# Patient Record
Sex: FEMALE | Race: WHITE | NOT HISPANIC OR LATINO | Employment: OTHER | URBAN - METROPOLITAN AREA
[De-identification: names, ages, dates, MRNs, and addresses within clinical notes are randomized per-mention and may not be internally consistent; named-entity substitution may affect disease eponyms.]

---

## 2017-12-06 PROBLEM — C79.51 METASTATIC CANCER TO SPINE (HCC): Status: ACTIVE | Noted: 2017-12-06

## 2017-12-06 PROBLEM — C34.92 SMALL CELL CARCINOMA OF LEFT LUNG (HCC): Status: ACTIVE | Noted: 2017-12-06

## 2017-12-07 ENCOUNTER — CONSULT (OUTPATIENT)
Dept: ONCOLOGY | Facility: CLINIC | Age: 77
End: 2017-12-07

## 2017-12-07 VITALS
HEIGHT: 64 IN | BODY MASS INDEX: 18.61 KG/M2 | TEMPERATURE: 98.4 F | WEIGHT: 109 LBS | DIASTOLIC BLOOD PRESSURE: 65 MMHG | RESPIRATION RATE: 14 BRPM | SYSTOLIC BLOOD PRESSURE: 126 MMHG | HEART RATE: 87 BPM

## 2017-12-07 DIAGNOSIS — C34.92 SMALL CELL CARCINOMA OF LEFT LUNG (HCC): Primary | ICD-10-CM

## 2017-12-07 DIAGNOSIS — C79.51 METASTATIC CANCER TO SPINE (HCC): ICD-10-CM

## 2017-12-07 PROCEDURE — 99205 OFFICE O/P NEW HI 60 MIN: CPT | Performed by: INTERNAL MEDICINE

## 2017-12-07 RX ORDER — OXYCODONE HYDROCHLORIDE 30 MG/1
TABLET ORAL
Qty: 125 TABLET | Refills: 0 | Status: SHIPPED | OUTPATIENT
Start: 2017-12-07 | End: 2017-12-22 | Stop reason: HOSPADM

## 2017-12-07 RX ORDER — OXYCODONE AND ACETAMINOPHEN 10; 325 MG/1; MG/1
1 TABLET ORAL EVERY 6 HOURS PRN
COMMUNITY
End: 2017-12-17 | Stop reason: SDUPTHER

## 2017-12-07 RX ORDER — ALPRAZOLAM 0.5 MG/1
0.5 TABLET ORAL 3 TIMES DAILY PRN
COMMUNITY
End: 2017-12-22 | Stop reason: HOSPADM

## 2017-12-07 RX ORDER — GABAPENTIN 600 MG/1
1200 TABLET ORAL 3 TIMES DAILY
COMMUNITY
End: 2017-12-22 | Stop reason: HOSPADM

## 2017-12-07 RX ORDER — PALONOSETRON 0.05 MG/ML
0.25 INJECTION, SOLUTION INTRAVENOUS ONCE
OUTPATIENT
Start: 2017-12-21

## 2017-12-07 RX ORDER — CYCLOBENZAPRINE HCL 10 MG
10 TABLET ORAL AS NEEDED
COMMUNITY
End: 2017-12-17

## 2017-12-07 RX ORDER — IBUPROFEN 800 MG/1
800 TABLET ORAL EVERY 6 HOURS PRN
Refills: 1 | COMMUNITY
Start: 2017-11-28

## 2017-12-07 RX ORDER — RIZATRIPTAN BENZOATE 10 MG/1
10 TABLET ORAL ONCE AS NEEDED
COMMUNITY

## 2017-12-07 RX ORDER — BUTALBITAL, ACETAMINOPHEN AND CAFFEINE 50; 325; 40 MG/1; MG/1; MG/1
1 TABLET ORAL AS NEEDED
COMMUNITY

## 2017-12-07 RX ORDER — OXYCODONE HYDROCHLORIDE 15 MG/1
15 TABLET ORAL EVERY 4 HOURS PRN
COMMUNITY
End: 2017-12-07

## 2017-12-07 NOTE — PROGRESS NOTES
CHIEF COMPLAINT: Small cell lung cancer    REASON FOR REFERRAL: Small cell lung cancer      RECORDS OBTAINED  Records of the patients history including those obtained from  [place] were reviewed and summarized in detail.       Small cell carcinoma of left lung    6/5/2017 Initial Diagnosis     Small cell carcinoma of left lung.  Records from [facility] were reviewed and history obtained thereof, underwent left thoracotomy and left lower wedge resection July 2017 with apparent rapid recurrence.  Was found to have metastatic disease to the spine and underwent kyphoplasty to L3 lesion on 11/1/2017.  Further workup later revealed a complete lesion of L1, no kyphoplasty possible due to posterior disc bulge threatening cord.  Per records received from [facility] she then underwent palliative radiation to T12 through L-2 for pain control.  Treatment was received at [facility], patient being referred to our facility due to moving here to be with family.         7/7/2017 Surgery     Surgery       Procedure:  Wedge resection of left lower lobe 1.4 cm lesion        Underwent left thoracotomy, left lower lobe wedge resection with Dr. Eb Rutledge at [facility].  Operative report findings revealed dense adhesions were found between the lung and the chest wall.               9/25/2017 Imaging     Total body bone scan with abnormal areas of increased activity in the skull, high degree of activity at L3 with recent MRI revealing a fracture in this area, increased activity in the sternum and ribs, indeterminate.         10/6/2017 Imaging     PET/CT skull to mid thigh:  Abnormal hypermetabolic 3.2 cm mass about the inferior left lower lobe maximum SUV 4.5.  Low-grade FDG hypermetabolism along the surgical suture line measuring with SUV value of 2.6.  Multiple ill-defined regions of low-grade FDG hypermetabolism within a few osseous structures, most noticeably the sternum, L1  vertebral body, and pelvis.         11/1/2017 Surgery     Surgery       Procedure:  Fluoroscopic guided L3 kyphoplasty, L3 bone biopsy with Dr. Jose M Dixon, [facility]                HISTORY OF PRESENT ILLNESS:  The patient is a 77 y.o.  female, referred for The patient was diagnosed June 2017 with small cell lung cancer.  She had this resected with left lower wedge resection but with short interval metastasis with compression fracture September 2017.  Kyphoplasty to L3 in October for this compression fracture but continued with pain.  Was treated to 20 gray in 5 fractions to T12 through L-2 for pain control and now comes to [place] to be with her family for further management.  Back pain is her main issue still and takes 15 mg oxycodone 1-2 every 6 hours.  Also takes Xanax 0.5 mg daily at bedtime.  She has a history of emphysema, migraines, pneumonia, and a subdural hematoma due to a fall.    REVIEW OF SYSTEMS:  A 14 point review of systems was performed and is negative except as noted above.    History reviewed. No pertinent past medical history.  Past Surgical History:   Procedure Laterality Date   • BRAIN SURGERY      [place]  Dr. Shelton/Carson  Brain bleed caused from a fall   • COLONOSCOPY     • LUNG BIOPSY Left 06/2017    [place]   • LUNG CANCER SURGERY Left 07/2017    [place]  [facility]       No current outpatient prescriptions on file prior to visit.     No current facility-administered medications on file prior to visit.        No Known Allergies    Social History     Social History   • Marital status:      Spouse name: N/A   • Number of children: N/A   • Years of education: N/A     Social History Main Topics   • Smoking status: Current Every Day Smoker     Packs/day: 2.00     Years: 60.00     Types: Cigarettes   • Smokeless tobacco: Never Used   • Alcohol use None   • Drug use: None   • Sexual activity: Not Asked     Other Topics Concern   • None     Social History Narrative   •  "None       Family History   Problem Relation Age of Onset   • Lung disease Father    • Lymphoma Sister        PHYSICAL EXAM:    /65  Pulse 87  Temp 98.4 °F (36.9 °C)  Resp 14  Ht 162.6 cm (64\")  Wt 49.4 kg (109 lb)  BMI 18.71 kg/m2    ECOG: (0) Fully active, able to carry on all predisease performance without restriction  General: well appearing female in no acute distress  HEENT: sclera anicteric, oropharynx clear  Lymphatics: no cervical, supraclavicular, inguinal, or axillary adenopathy  Cardiovascular: regular rate and rhythm, no murmurs  Neck: Supple; No thyromegaly  Lungs: clear to auscultation bilaterally. No respiratory distress.   Abdomen: soft, nontender, nondistended.  No palpable organomegaly  Extremities: no cyanosis, clubbing, edema, or cords  Skin: no rashes, lesions, bruising, or petechiae  Neuro: Alert and oriented x 4; Moving all extremities.  Psych: No anxiety or depression    No results found for any previous visit.    Assessment/Plan     1. Small cell lung cancer  2. Bone metastasis  3. Severe pain    Discussion:: She is now going to get her care here in [place].  I will get palliative care on board and have increased her oxycodone to 30 mg immediate release one to 2 every 4-6 hours as needed.  We will start her on cisplatin and etoposide given daily for 3 days every 21 days as counts allow along with Xgeva for her bones.  She understands the palliative nature of this and we had her sign informed consent at testing to our intent to control and not cure the cancer.  I need to reestablish her baseline status now but it's been more than a month since her last scan and has had no significant treatment other than radiation in that time and a lot can change and we need accurate assessment at this point.  We'll get her PET scan and MRI of brain here and that can service our future baseline.      Dillan Beltrán MD    12/7/2017  "

## 2017-12-11 ENCOUNTER — OFFICE VISIT (OUTPATIENT)
Dept: CARDIAC SURGERY | Facility: CLINIC | Age: 77
End: 2017-12-11

## 2017-12-11 VITALS
SYSTOLIC BLOOD PRESSURE: 160 MMHG | BODY MASS INDEX: 18.95 KG/M2 | HEIGHT: 64 IN | DIASTOLIC BLOOD PRESSURE: 80 MMHG | HEART RATE: 102 BPM | WEIGHT: 111 LBS | TEMPERATURE: 99.1 F

## 2017-12-11 DIAGNOSIS — C34.92 SMALL CELL CARCINOMA OF LEFT LUNG (HCC): Primary | ICD-10-CM

## 2017-12-11 DIAGNOSIS — C79.51 METASTATIC CANCER TO SPINE (HCC): ICD-10-CM

## 2017-12-11 PROCEDURE — 99204 OFFICE O/P NEW MOD 45 MIN: CPT | Performed by: THORACIC SURGERY (CARDIOTHORACIC VASCULAR SURGERY)

## 2017-12-11 RX ORDER — OXYCODONE AND ACETAMINOPHEN 10; 325 MG/1; MG/1
1 TABLET ORAL EVERY 6 HOURS PRN
COMMUNITY
End: 2017-12-22 | Stop reason: HOSPADM

## 2017-12-11 NOTE — PROGRESS NOTES
12/11/2017  Patient Information  Lorri Bedoya                                                                                          [address]  [place] [address]   1940  'PCP/Referring Physician'  Isidoro Zaidi MD  [phone]  Dillan Beltrán MD  [phone]  Chief Complaint   Patient presents with   • Consult     NP per Dr. Beltrán for Port Placement for Lung Cancer       History of Present Illness:  Patient is referred to me to evaluate for [id number] placement.  She had undergone previously a wedge resection of the left lower lobe lesion at another hospital in July, 2017 and apparently had rapid recurrence and/or metastasis.  She is known to have metastatic disease to the bone also and she has significant complaint from compression fractures of the spine and is referred to see me for evaluation for an [id number] placement for chemotherapy.  At this time she says that her back is in constant pain.      Patient Active Problem List   Diagnosis   • Small cell carcinoma of left lung   • Metastatic cancer to spine     Past Medical History:   Diagnosis Date   • Lung cancer      Past Surgical History:   Procedure Laterality Date   • BRAIN SURGERY      [place] Shelton/Carson  Brain bleed caused from a fall   • COLONOSCOPY     • LUNG BIOPSY Left 06/2017    [place]   • LUNG CANCER SURGERY Left 07/2017    [place]  [facility]       Current Outpatient Prescriptions:   •  ALPRAZolam (XANAX) 0.5 MG tablet, Take 0.5 mg by mouth 3 (Three) Times a Day As Needed for Anxiety., Disp: , Rfl:   •  butalbital-acetaminophen-caffeine (FIORICET, ESGIC) -40 MG per tablet, Take 1 tablet by mouth 2 (Two) Times a Day., Disp: , Rfl:   •  DIPHENHYDRAMINE HCL, SLEEP, PO, Take  by mouth. 50 Mg as needed, Disp: , Rfl:   •  oxyCODONE (ROXICODONE) 30 MG immediate release tablet, Take 1-2 by mouth every 4-6 hours as needed for pain, Disp: 125 tablet, Rfl: 0  •  oxyCODONE-acetaminophen (PERCOCET)  MG per  tablet, Take 1 tablet by mouth Every 6 (Six) Hours As Needed for Moderate Pain ., Disp: , Rfl:   •  rizatriptan (MAXALT) 10 MG tablet, Take 10 mg by mouth 1 (One) Time As Needed for Migraine. May repeat in 2 hours if needed, Disp: , Rfl:   •  cyclobenzaprine (FLEXERIL) 10 MG tablet, Take 10 mg by mouth As Needed for Muscle Spasms., Disp: , Rfl:   •  gabapentin (NEURONTIN) 600 MG tablet, Take 1,200 mg by mouth 3 (Three) Times a Day., Disp: , Rfl:   •  ibuprofen (ADVIL,MOTRIN) 800 MG tablet, , Disp: , Rfl: 1  •  oxyCODONE-acetaminophen (PERCOCET)  MG per tablet, Take 1 tablet by mouth Every 6 (Six) Hours As Needed for Moderate Pain ., Disp: , Rfl:   No Known Allergies  Social History     Social History   • Marital status:      Spouse name: N/A   • Number of children: 2   • Years of education: N/A     Occupational History   • Bookeeper      Retired     Social History Main Topics   • Smoking status: Current Every Day Smoker     Packs/day: 0.25     Years: 60.00     Types: Cigarettes   • Smokeless tobacco: Never Used      Comment: As much as 1 1/2 PPD Prev.    • Alcohol use No   • Drug use: No   • Sexual activity: Not on file     Other Topics Concern   • Not on file     Social History Narrative     Family History   Problem Relation Age of Onset   • Lung disease Father    • Lymphoma Sister      Review of Systems   Constitution: Positive for weight loss (10 lbs this year). Negative for chills, fever, malaise/fatigue and night sweats.   HENT: Negative for hearing loss, odynophagia and sore throat.    Cardiovascular: Positive for dyspnea on exertion and leg swelling. Negative for chest pain, orthopnea and palpitations.   Respiratory: Negative for cough and hemoptysis.    Endocrine: Negative for cold intolerance, heat intolerance, polydipsia, polyphagia and polyuria.   Hematologic/Lymphatic: Bruises/bleeds easily.   Skin: Negative for itching and rash.   Musculoskeletal: Positive for back pain. Negative for joint  "pain, joint swelling and myalgias.   Gastrointestinal: Positive for constipation. Negative for abdominal pain, diarrhea, hematemesis, hematochezia, melena, nausea and vomiting.   Genitourinary: Negative for dysuria, frequency and hematuria.   Neurological: Negative for focal weakness, headaches, numbness and seizures.   Psychiatric/Behavioral: Negative for suicidal ideas.   All other systems reviewed and are negative.    Vitals:    12/11/17 0820   BP: 160/80   BP Location: Left arm   Patient Position: Sitting   Pulse: 102   Temp: 99.1 °F (37.3 °C)   Weight: 50.3 kg (111 lb)   Height: 162.6 cm (64\")      Physical Exam   CONSTITUTIONAL: Alert and conversant, Well dressed, in a wheelchair clearly uncomfortable complaining of back pain  EYES: Sclera clean, Anicteric, Pupils equal  ENT: No nasal deviation, Trachea midline  NECK: No neck masses, Supple  LUNGS: No wheezing, Cough, non-congested  HEART: No rubs, No murmurs  GASTROINTESTINAL: Soft, non-distended, No masses, Non tender  to palpation, normal bowel sounds  NEURO: No motor deficits, No sensory deficits, Cranial Nerves 2 through 12 grossly intact  PSYCHIATRIC: Oriented to person, place and time, No memory deficits, Mood appropriate  VASCULAR: No carotid bruits, Femoral pulses palpable and symmetric  MUSKULOSKELETAL: No extremity trauma or extremity asymmetry    Labs/Imaging:   I reviewed the PET scan report demonstrating the metastatic lesion.    Assessment/Plan:   Metastatic small cell cancer will require chemotherapy and I went over that with the patient and her caregiver today.  She is aware of the planned procedure for a port placement.  She understands that this is a surgical procedure that requires an incision.  The risks of pneumothorax, bleeding and device failure and infection have been explained and she agrees to proceed.       Patient Active Problem List   Diagnosis   • Small cell carcinoma of left lung   • Metastatic cancer to spine     Signed " by: Emir Marcos M.D.    12/11/17    CC:  MD Dillan Díaz MD Debbie Moore, , editing for Emir Marcos M.D.    I, Emir Marcos MD, have read and agree with the editing done by Amanda Jones, .

## 2017-12-13 ENCOUNTER — PREP FOR SURGERY (OUTPATIENT)
Dept: OTHER | Facility: HOSPITAL | Age: 77
End: 2017-12-13

## 2017-12-13 ENCOUNTER — HOSPITAL ENCOUNTER (OUTPATIENT)
Dept: PET IMAGING | Facility: HOSPITAL | Age: 77
Discharge: HOME | End: 2017-12-13
Attending: INTERNAL MEDICINE

## 2017-12-13 ENCOUNTER — HOSPITAL ENCOUNTER (OUTPATIENT)
Dept: MRI IMAGING | Facility: HOSPITAL | Age: 77
Discharge: HOME | End: 2017-12-13
Attending: INTERNAL MEDICINE

## 2017-12-13 ENCOUNTER — HOSPITAL ENCOUNTER (OUTPATIENT)
Dept: PET IMAGING | Facility: HOSPITAL | Age: 77
Discharge: HOME | End: 2017-12-13
Attending: INTERNAL MEDICINE | Admitting: INTERNAL MEDICINE

## 2017-12-13 DIAGNOSIS — C34.92 SMALL CELL CARCINOMA OF LEFT LUNG (HCC): ICD-10-CM

## 2017-12-13 DIAGNOSIS — C34.90 SMALL CELL CARCINOMA OF LUNG, UNSPECIFIED LATERALITY: Primary | ICD-10-CM

## 2017-12-13 DIAGNOSIS — C34.90 MALIGNANT NEOPLASM OF LUNG, UNSPECIFIED LATERALITY, UNSPECIFIED PART OF LUNG (HCC): Primary | ICD-10-CM

## 2017-12-13 LAB — GLUCOSE BLDC GLUCOMTR-MCNC: 111 MG/DL (ref 70–130)

## 2017-12-13 PROCEDURE — 78816 PET IMAGE W/CT FULL BODY: CPT

## 2017-12-13 PROCEDURE — 70553 MRI BRAIN STEM W/O & W/DYE: CPT

## 2017-12-13 PROCEDURE — A9577 INJ MULTIHANCE: HCPCS | Performed by: INTERNAL MEDICINE

## 2017-12-13 PROCEDURE — 0 GADOBENATE DIMEGLUMINE 529 MG/ML SOLUTION: Performed by: INTERNAL MEDICINE

## 2017-12-13 PROCEDURE — 0 FLUDEOXYGLUCOSE F18 SOLUTION: Performed by: INTERNAL MEDICINE

## 2017-12-13 PROCEDURE — A9552 F18 FDG: HCPCS | Performed by: INTERNAL MEDICINE

## 2017-12-13 PROCEDURE — 82565 ASSAY OF CREATININE: CPT

## 2017-12-13 PROCEDURE — 82962 GLUCOSE BLOOD TEST: CPT

## 2017-12-13 RX ADMIN — FLUDEOXYGLUCOSE F18 1 DOSE: 300 INJECTION INTRAVENOUS at 11:55

## 2017-12-13 RX ADMIN — GADOBENATE DIMEGLUMINE 10 ML: 529 INJECTION, SOLUTION INTRAVENOUS at 14:20

## 2017-12-14 ENCOUNTER — TELEPHONE (OUTPATIENT)
Dept: ONCOLOGY | Facility: CLINIC | Age: 77
End: 2017-12-14

## 2017-12-14 DIAGNOSIS — C79.51 METASTATIC CANCER TO SPINE (HCC): ICD-10-CM

## 2017-12-14 DIAGNOSIS — C34.92 SMALL CELL CARCINOMA OF LEFT LUNG (HCC): ICD-10-CM

## 2017-12-14 RX ORDER — CEFAZOLIN SODIUM 2 G/100ML
2 INJECTION, SOLUTION INTRAVENOUS ONCE
Status: CANCELLED | OUTPATIENT
Start: 2017-12-18 | End: 2017-12-18

## 2017-12-14 RX ORDER — LIDOCAINE AND PRILOCAINE 25; 25 MG/G; MG/G
CREAM TOPICAL AS NEEDED
Qty: 30 G | Refills: 3 | Status: SHIPPED | OUTPATIENT
Start: 2017-12-14 | End: 2017-12-17

## 2017-12-14 RX ORDER — CHLORHEXIDINE GLUCONATE 500 MG/1
1 CLOTH TOPICAL EVERY 12 HOURS PRN
Status: CANCELLED | OUTPATIENT
Start: 2017-12-17

## 2017-12-14 NOTE — TELEPHONE ENCOUNTER
----- Message from Cathie Patel MA sent at 12/14/2017 12:04 PM EST -----  Regarding: Leigh Ann- issue w port placement schedule  Contact: [phone]  Pt's daughter Dee states her mother is not due to get her port placed until 8am Monday. Pt is set to start her chemo on Monday. Dee is wondering if her chemo treatments need to be delayed?    Please call to discuss.

## 2017-12-14 NOTE — TELEPHONE ENCOUNTER
Discussed with Dee.  We will not be able to treat her after port placement since she is receiving cisplatin due to time constraints.  We will treat her T-Th for this cycle.  Spoke with Sheba. She will keep current appointments for Tuesday and Wednesday and appointment made for Thursday at 8:00.  Dee aware of new appointment info.

## 2017-12-15 ENCOUNTER — OFFICE VISIT (OUTPATIENT)
Dept: ONCOLOGY | Facility: CLINIC | Age: 77
End: 2017-12-15

## 2017-12-15 ENCOUNTER — LAB (OUTPATIENT)
Dept: LAB | Facility: HOSPITAL | Age: 77
End: 2017-12-15

## 2017-12-15 VITALS
HEIGHT: 64 IN | BODY MASS INDEX: 18.95 KG/M2 | WEIGHT: 111 LBS | SYSTOLIC BLOOD PRESSURE: 126 MMHG | RESPIRATION RATE: 16 BRPM | HEART RATE: 96 BPM | TEMPERATURE: 98 F | DIASTOLIC BLOOD PRESSURE: 56 MMHG

## 2017-12-15 DIAGNOSIS — C79.51 METASTATIC CANCER TO SPINE (HCC): ICD-10-CM

## 2017-12-15 DIAGNOSIS — C34.92 SMALL CELL CARCINOMA OF LEFT LUNG (HCC): Primary | ICD-10-CM

## 2017-12-15 DIAGNOSIS — C34.92 SMALL CELL CARCINOMA OF LEFT LUNG (HCC): ICD-10-CM

## 2017-12-15 LAB
ALBUMIN SERPL-MCNC: 4.1 G/DL (ref 3.2–4.8)
ALBUMIN/GLOB SERPL: 1.4 G/DL (ref 1.5–2.5)
ALP SERPL-CCNC: 174 U/L (ref 25–100)
ALT SERPL W P-5'-P-CCNC: 14 U/L (ref 7–40)
ANION GAP SERPL CALCULATED.3IONS-SCNC: 13 MMOL/L (ref 3–11)
AST SERPL-CCNC: 38 U/L (ref 0–33)
BILIRUB SERPL-MCNC: 0.9 MG/DL (ref 0.3–1.2)
BUN BLD-MCNC: 19 MG/DL (ref 9–23)
BUN/CREAT SERPL: 27.1 (ref 7–25)
CALCIUM SPEC-SCNC: 10.4 MG/DL (ref 8.7–10.4)
CHLORIDE SERPL-SCNC: 97 MMOL/L (ref 99–109)
CO2 SERPL-SCNC: 32 MMOL/L (ref 20–31)
CREAT BLD-MCNC: 0.7 MG/DL (ref 0.6–1.3)
ERYTHROCYTE [DISTWIDTH] IN BLOOD BY AUTOMATED COUNT: 21.1 % (ref 11.3–14.5)
GFR SERPL CREATININE-BSD FRML MDRD: 81 ML/MIN/1.73
GLOBULIN UR ELPH-MCNC: 3 GM/DL
GLUCOSE BLD-MCNC: 125 MG/DL (ref 70–100)
HCT VFR BLD AUTO: 48.6 % (ref 34.5–44)
HGB BLD-MCNC: 15.5 G/DL (ref 11.5–15.5)
LYMPHOCYTES # BLD AUTO: 1 10*3/MM3 (ref 0.6–4.8)
LYMPHOCYTES NFR BLD AUTO: 12.1 % (ref 24–44)
MAGNESIUM SERPL-MCNC: 1.9 MG/DL (ref 1.3–2.7)
MCH RBC QN AUTO: 30.9 PG (ref 27–31)
MCHC RBC AUTO-ENTMCNC: 31.8 G/DL (ref 32–36)
MCV RBC AUTO: 97.1 FL (ref 80–99)
MONOCYTES # BLD AUTO: 0.3 10*3/MM3 (ref 0–1)
MONOCYTES NFR BLD AUTO: 3.1 % (ref 0–12)
NEUTROPHILS # BLD AUTO: 7.2 10*3/MM3 (ref 1.5–8.3)
NEUTROPHILS NFR BLD AUTO: 84.8 % (ref 41–71)
PHOSPHATE SERPL-MCNC: 2.3 MG/DL (ref 2.4–5.1)
PLATELET # BLD AUTO: 100 10*3/MM3 (ref 150–450)
PMV BLD AUTO: 8.3 FL (ref 6–12)
POTASSIUM BLD-SCNC: 2.8 MMOL/L (ref 3.5–5.5)
PROT SERPL-MCNC: 7.1 G/DL (ref 5.7–8.2)
RBC # BLD AUTO: 5 10*6/MM3 (ref 3.89–5.14)
SODIUM BLD-SCNC: 142 MMOL/L (ref 132–146)
WBC NRBC COR # BLD: 8.5 10*3/MM3 (ref 3.5–10.8)

## 2017-12-15 PROCEDURE — 80053 COMPREHEN METABOLIC PANEL: CPT

## 2017-12-15 PROCEDURE — 99214 OFFICE O/P EST MOD 30 MIN: CPT | Performed by: INTERNAL MEDICINE

## 2017-12-15 PROCEDURE — 36415 COLL VENOUS BLD VENIPUNCTURE: CPT

## 2017-12-15 PROCEDURE — 83735 ASSAY OF MAGNESIUM: CPT

## 2017-12-15 PROCEDURE — 85025 COMPLETE CBC W/AUTO DIFF WBC: CPT

## 2017-12-15 PROCEDURE — 84100 ASSAY OF PHOSPHORUS: CPT

## 2017-12-15 RX ORDER — MORPHINE SULFATE 15 MG/1
15 TABLET, FILM COATED, EXTENDED RELEASE ORAL 2 TIMES DAILY
Qty: 60 TABLET | Refills: 0 | Status: SHIPPED | OUTPATIENT
Start: 2017-12-15 | End: 2017-12-22 | Stop reason: HOSPADM

## 2017-12-15 NOTE — PROGRESS NOTES
CHIEF COMPLAINT: Small cell lung cancer management    Problem List:     Small cell carcinoma of left lung    6/5/2017 Initial Diagnosis     Small cell carcinoma of left lung.  Records from [facility] were reviewed and history obtained thereof, underwent left thoracotomy and left lower wedge resection July 2017 with apparent rapid recurrence.  Was found to have metastatic disease to the spine and underwent kyphoplasty to L3 lesion on 11/1/2017.  Further workup later revealed a complete lesion of L1, no kyphoplasty possible due to posterior disc bulge threatening cord.  Per records received from [facility] she then underwent palliative radiation to T12 through L-2 for pain control.  Treatment was received at [facility], patient being referred to our facility due to moving here to be with family.         7/7/2017 Surgery     Surgery       Procedure:  Wedge resection of left lower lobe 1.4 cm lesion        Underwent left thoracotomy, left lower lobe wedge resection with Dr. Eb Rutledge at [facility].  Operative report findings revealed dense adhesions were found between the lung and the chest wall.               9/25/2017 Imaging     Total body bone scan with abnormal areas of increased activity in the skull, high degree of activity at L3 with recent MRI revealing a fracture in this area, increased activity in the sternum and ribs, indeterminate.         10/6/2017 Imaging     PET/CT skull to mid thigh:  Abnormal hypermetabolic 3.2 cm mass about the inferior left lower lobe maximum SUV 4.5.  Low-grade FDG hypermetabolism along the surgical suture line measuring with SUV value of 2.6.  Multiple ill-defined regions of low-grade FDG hypermetabolism within a few osseous structures, most noticeably the sternum, L1 vertebral body, and pelvis.         11/1/2017 Surgery     Surgery       Procedure:  Fluoroscopic guided L3 kyphoplasty, L3 bone biopsy with Dr. Jose M Dixon,  [facility]             12/13/2017 Imaging     PET scan shows too numerous to count bone metastases as well as left lung and pleural abnormality and a liver lesion.  MRI of the brain shows chronic changes.            HISTORY OF PRESENT ILLNESS:  The patient is a 77 y.o. female, here for follow up on management of Small cell lung cancer.  I reviewed her PET and MRI brain images as above and shared them with the patient.  She has too numerous to count bone metastases, left lung, pleural, and probable liver metastases.  No brain metastases.      Past Medical History:   Diagnosis Date   • Lung cancer      Past Surgical History:   Procedure Laterality Date   • BRAIN SURGERY      [place]  Dr. Shelton/Carson  Brain bleed caused from a fall   • COLONOSCOPY     • LUNG BIOPSY Left 06/2017    [place]   • LUNG CANCER SURGERY Left 07/2017    [place]  [facility]       No Known Allergies    Family History and Social History reviewed and changed as necessary      REVIEW OF SYSTEM:   Review of Systems   Constitutional: Negative for appetite change, chills, diaphoresis, fatigue, fever and unexpected weight change.   HENT:   Negative for mouth sores, sore throat and trouble swallowing.    Eyes: Negative for icterus.   Respiratory: Negative for cough, hemoptysis and shortness of breath.    Cardiovascular: Negative for chest pain, leg swelling and palpitations.   Gastrointestinal: Negative for abdominal distention, abdominal pain, blood in stool, constipation, diarrhea, nausea and vomiting.   Endocrine: Negative for hot flashes.   Genitourinary: Negative for bladder incontinence, difficulty urinating, dysuria, frequency and hematuria.    Musculoskeletal: Negative for gait problem, neck pain and neck stiffness.   Skin: Negative for rash.   Neurological: Negative for dizziness, gait problem, headaches, light-headedness and numbness.   Hematological: Negative for adenopathy. Does not bruise/bleed easily.  "  Psychiatric/Behavioral: Negative for depression. The patient is not nervous/anxious.    All other systems reviewed and are negative.       PHYSICAL EXAM    Vitals:    12/15/17 1029   BP: 126/56   Pulse: 96   Resp: 16   Temp: 98 °F (36.7 °C)   Weight: 50.3 kg (111 lb)   Height: 162.6 cm (64\")     Constitutional: Appears well-developed and well-nourished. No distress.   ECOG: (0) Fully active, able to carry on all predisease performance without restriction  HENT:   Head: Normocephalic.   Mouth/Throat: Oropharynx is clear and moist.   Eyes: Conjunctivae are normal. Pupils are equal, round, and reactive to light. No scleral icterus.   Neck: Neck supple. No JVD present. No thyromegaly present.   Cardiovascular: Normal rate, regular rhythm and normal heart sounds.    Pulmonary/Chest: Breath sounds normal. No respiratory distress.   Abdominal: Soft. Exhibits no distension and no mass. There is no hepatosplenomegaly. There is no tenderness. There is no rebound and no guarding.   Musculoskeletal:Exhibits no edema, tenderness or deformity.   Neurological: Alert and oriented to person, place, and time. Exhibits normal muscle tone.   Skin: No ecchymosis, no petechiae and no rash noted. Not diaphoretic. No cyanosis. Nails show no clubbing.   Psychiatric: Normal mood and affect.   Vitals reviewed.      Lab on 12/15/2017   Component Date Value Ref Range Status   • WBC 12/15/2017 8.50  3.50 - 10.80 10*3/mm3 Final   • RBC 12/15/2017 5.00  3.89 - 5.14 10*6/mm3 Final   • Hemoglobin 12/15/2017 15.5  11.5 - 15.5 g/dL Final   • Hematocrit 12/15/2017 48.6* 34.5 - 44.0 % Final   • RDW 12/15/2017 21.1* 11.3 - 14.5 % Final   • MCV 12/15/2017 97.1  80.0 - 99.0 fL Final   • MCH 12/15/2017 30.9  27.0 - 31.0 pg Final   • MCHC 12/15/2017 31.8* 32.0 - 36.0 g/dL Final   • MPV 12/15/2017 8.3  6.0 - 12.0 fL Final   • Platelets 12/15/2017 100* 150 - 450 10*3/mm3 Final    Verified by repeat analysis.    • Neutrophil % 12/15/2017 84.8* 41.0 - 71.0 % " Final   • Lymphocyte % 12/15/2017 12.1* 24.0 - 44.0 % Final   • Monocyte % 12/15/2017 3.1  0.0 - 12.0 % Final   • Neutrophils, Absolute 12/15/2017 7.20  1.50 - 8.30 10*3/mm3 Final   • Lymphocytes, Absolute 12/15/2017 1.00  0.60 - 4.80 10*3/mm3 Final   • Monocytes, Absolute 12/15/2017 0.30  0.00 - 1.00 10*3/mm3 Final   Hospital Outpatient Visit on 12/13/2017   Component Date Value Ref Range Status   • Glucose 12/13/2017 111  70 - 130 mg/dL Final       Assessment/Plan     1.  Small cell lung cancer  2. Bone metastases  3. Lung and pleural involvement  4. Probable liver metastasis  5. Bone pains    Discussion: I want her to follow-up with palliative care for symptom management.  We'll start cisplatin and etoposide course #1 on 12/19/17 along with Xgeva for her bones.  Risks including renal dysfunction and pancytopenia and neuropathy were all discussed.  She has signed consent.  We'll follow her up on January 8 for course #2.  The Xgeva we will give every 6 weeks.  We will try to give the chemotherapy every 3 weeks.  Risks including infection and death were discussed. Add MS Contin 15mg every 12 hours.      Dillan Beltrán MD    12/15/2017

## 2017-12-17 ENCOUNTER — APPOINTMENT (OUTPATIENT)
Dept: PREADMISSION TESTING | Facility: HOSPITAL | Age: 77
End: 2017-12-17

## 2017-12-17 VITALS — BODY MASS INDEX: 18.66 KG/M2 | WEIGHT: 111.99 LBS | HEIGHT: 65 IN

## 2017-12-17 DIAGNOSIS — C34.90 MALIGNANT NEOPLASM OF LUNG, UNSPECIFIED LATERALITY, UNSPECIFIED PART OF LUNG (HCC): ICD-10-CM

## 2017-12-17 LAB
ANION GAP SERPL CALCULATED.3IONS-SCNC: 13 MMOL/L (ref 3–11)
APTT PPP: 25.2 SECONDS (ref 24–31)
BUN BLD-MCNC: 14 MG/DL (ref 9–23)
BUN/CREAT SERPL: 23.3 (ref 7–25)
CALCIUM SPEC-SCNC: 10.4 MG/DL (ref 8.7–10.4)
CHLORIDE SERPL-SCNC: 98 MMOL/L (ref 99–109)
CO2 SERPL-SCNC: 34 MMOL/L (ref 20–31)
CREAT BLD-MCNC: 0.6 MG/DL (ref 0.6–1.3)
DEPRECATED RDW RBC AUTO: 68.9 FL (ref 37–54)
ERYTHROCYTE [DISTWIDTH] IN BLOOD BY AUTOMATED COUNT: 19.6 % (ref 11.3–14.5)
GFR SERPL CREATININE-BSD FRML MDRD: 96 ML/MIN/1.73
GLUCOSE BLD-MCNC: 149 MG/DL (ref 70–100)
HBA1C MFR BLD: 5.7 % (ref 4.8–5.6)
HCT VFR BLD AUTO: 48.8 % (ref 34.5–44)
HGB BLD-MCNC: 15.5 G/DL (ref 11.5–15.5)
INR PPP: 1.1
MCH RBC QN AUTO: 32.2 PG (ref 27–31)
MCHC RBC AUTO-ENTMCNC: 31.8 G/DL (ref 32–36)
MCV RBC AUTO: 101.2 FL (ref 80–99)
PLATELET # BLD AUTO: 67 10*3/MM3 (ref 150–450)
PMV BLD AUTO: 11.3 FL (ref 6–12)
POTASSIUM BLD-SCNC: 2.8 MMOL/L (ref 3.5–5.5)
PROTHROMBIN TIME: 12 SECONDS (ref 9.6–11.5)
RBC # BLD AUTO: 4.82 10*6/MM3 (ref 3.89–5.14)
SODIUM BLD-SCNC: 145 MMOL/L (ref 132–146)
WBC NRBC COR # BLD: 7.8 10*3/MM3 (ref 3.5–10.8)

## 2017-12-17 PROCEDURE — 83036 HEMOGLOBIN GLYCOSYLATED A1C: CPT | Performed by: PHYSICIAN ASSISTANT

## 2017-12-17 PROCEDURE — 85610 PROTHROMBIN TIME: CPT | Performed by: PHYSICIAN ASSISTANT

## 2017-12-17 PROCEDURE — 85730 THROMBOPLASTIN TIME PARTIAL: CPT | Performed by: PHYSICIAN ASSISTANT

## 2017-12-17 PROCEDURE — 80048 BASIC METABOLIC PNL TOTAL CA: CPT | Performed by: PHYSICIAN ASSISTANT

## 2017-12-17 PROCEDURE — 36415 COLL VENOUS BLD VENIPUNCTURE: CPT

## 2017-12-17 PROCEDURE — 93010 ELECTROCARDIOGRAM REPORT: CPT | Performed by: INTERNAL MEDICINE

## 2017-12-17 PROCEDURE — 85027 COMPLETE CBC AUTOMATED: CPT | Performed by: PHYSICIAN ASSISTANT

## 2017-12-17 PROCEDURE — 93005 ELECTROCARDIOGRAM TRACING: CPT

## 2017-12-17 NOTE — PAT
plt count and K+ called to [facility] - have pt come early if can to pre-op and have K+ repeated in pre op per [facility]-   Contacted Pt. Daughter and they will try to come earlier in the a.m. But they are not certain it will be much earlier than previously planned.

## 2017-12-18 ENCOUNTER — HOSPITAL ENCOUNTER (OUTPATIENT)
Facility: HOSPITAL | Age: 77
Setting detail: HOSPITAL OUTPATIENT SURGERY
End: 2017-12-18
Attending: THORACIC SURGERY (CARDIOTHORACIC VASCULAR SURGERY) | Admitting: THORACIC SURGERY (CARDIOTHORACIC VASCULAR SURGERY)

## 2017-12-18 ENCOUNTER — PREP FOR SURGERY (OUTPATIENT)
Dept: OTHER | Facility: HOSPITAL | Age: 77
End: 2017-12-18

## 2017-12-18 ENCOUNTER — HOSPITAL ENCOUNTER (OUTPATIENT)
Facility: HOSPITAL | Age: 77
Setting detail: HOSPITAL OUTPATIENT SURGERY
Discharge: HOME | End: 2017-12-18
Attending: THORACIC SURGERY (CARDIOTHORACIC VASCULAR SURGERY) | Admitting: THORACIC SURGERY (CARDIOTHORACIC VASCULAR SURGERY)

## 2017-12-18 ENCOUNTER — APPOINTMENT (OUTPATIENT)
Dept: ONCOLOGY | Facility: HOSPITAL | Age: 77
End: 2017-12-18

## 2017-12-18 DIAGNOSIS — C34.90 MALIGNANT NEOPLASM OF LUNG, UNSPECIFIED LATERALITY, UNSPECIFIED PART OF LUNG (HCC): ICD-10-CM

## 2017-12-18 DIAGNOSIS — C34.90 LUNG CANCER (HCC): Primary | ICD-10-CM

## 2017-12-18 LAB
CREAT BLDA-MCNC: 0.6 MG/DL (ref 0.6–1.3)
POTASSIUM BLD-SCNC: 2.7 MMOL/L (ref 3.5–5.5)

## 2017-12-18 PROCEDURE — 84132 ASSAY OF SERUM POTASSIUM: CPT | Performed by: PHYSICIAN ASSISTANT

## 2017-12-18 RX ORDER — POTASSIUM CHLORIDE 750 MG/1
40 CAPSULE, EXTENDED RELEASE ORAL ONCE
Status: COMPLETED | OUTPATIENT
Start: 2017-12-18 | End: 2017-12-18

## 2017-12-18 RX ORDER — FAMOTIDINE 10 MG/ML
20 INJECTION, SOLUTION INTRAVENOUS ONCE
Status: COMPLETED | OUTPATIENT
Start: 2017-12-18 | End: 2017-12-18

## 2017-12-18 RX ORDER — SODIUM CHLORIDE, SODIUM LACTATE, POTASSIUM CHLORIDE, CALCIUM CHLORIDE 600; 310; 30; 20 MG/100ML; MG/100ML; MG/100ML; MG/100ML
9 INJECTION, SOLUTION INTRAVENOUS CONTINUOUS PRN
Status: DISCONTINUED | OUTPATIENT
Start: 2017-12-18 | End: 2017-12-18 | Stop reason: HOSPADM

## 2017-12-18 RX ORDER — LIDOCAINE HYDROCHLORIDE 10 MG/ML
0.5 INJECTION, SOLUTION EPIDURAL; INFILTRATION; INTRACAUDAL; PERINEURAL ONCE AS NEEDED
Status: COMPLETED | OUTPATIENT
Start: 2017-12-18 | End: 2017-12-18

## 2017-12-18 RX ORDER — LIDOCAINE AND PRILOCAINE 25; 25 MG/G; MG/G
CREAM TOPICAL AS NEEDED
COMMUNITY

## 2017-12-18 RX ORDER — CHLORHEXIDINE GLUCONATE 500 MG/1
1 CLOTH TOPICAL EVERY 12 HOURS PRN
Status: DISCONTINUED | OUTPATIENT
Start: 2017-12-18 | End: 2017-12-18 | Stop reason: HOSPADM

## 2017-12-18 RX ORDER — CEFAZOLIN SODIUM 2 G/100ML
2 INJECTION, SOLUTION INTRAVENOUS ONCE
Status: DISCONTINUED | OUTPATIENT
Start: 2017-12-18 | End: 2017-12-18 | Stop reason: HOSPADM

## 2017-12-18 RX ORDER — FAMOTIDINE 20 MG/1
20 TABLET, FILM COATED ORAL ONCE
Status: COMPLETED | OUTPATIENT
Start: 2017-12-18 | End: 2017-12-18

## 2017-12-18 RX ADMIN — FAMOTIDINE 20 MG: 20 TABLET ORAL at 08:22

## 2017-12-18 RX ADMIN — POTASSIUM CHLORIDE 40 MEQ: 750 CAPSULE, EXTENDED RELEASE ORAL at 08:48

## 2017-12-18 RX ADMIN — LIDOCAINE HYDROCHLORIDE 0.5 ML: 10 INJECTION, SOLUTION EPIDURAL; INFILTRATION; INTRACAUDAL; PERINEURAL at 08:22

## 2017-12-18 RX ADMIN — SODIUM CHLORIDE, POTASSIUM CHLORIDE, SODIUM LACTATE AND CALCIUM CHLORIDE 9 ML/HR: 600; 310; 30; 20 INJECTION, SOLUTION INTRAVENOUS at 08:22

## 2017-12-18 NOTE — INTERVAL H&P NOTE
Pre-Op H&P (See Recent Office Note Attached for Full H&P)    Review of Systems:  General ROS:  no fever, chills, rashes, No change since last office visit  Cardiovascular ROS: no chest pain or dyspnea on exertion  Respiratory ROS: no cough, shortness of breath, or wheezing    Immunization History:   Influenza:  no  Pneumococcal:  Yes < 5 years  Tetanus:  unknown    Meds:    No current facility-administered medications on file prior to encounter.      Current Outpatient Prescriptions on File Prior to Encounter   Medication Sig Dispense Refill   • ALPRAZolam (XANAX) 0.5 MG tablet Take 0.5 mg by mouth 3 (Three) Times a Day As Needed for Anxiety.     • butalbital-acetaminophen-caffeine (FIORICET, ESGIC) -40 MG per tablet Take 1 tablet by mouth As Needed for Headache or Migraine.     • DIPHENHYDRAMINE HCL, SLEEP, PO Take 50 mg by mouth Every Night. 50 Mg as needed      • gabapentin (NEURONTIN) 600 MG tablet Take 1,200 mg by mouth 3 (Three) Times a Day.     • ibuprofen (ADVIL,MOTRIN) 800 MG tablet Take 800 mg by mouth Every 6 (Six) Hours As Needed for Moderate Pain .  1   • oxyCODONE (ROXICODONE) 30 MG immediate release tablet Take 1-2 by mouth every 4-6 hours as needed for pain (Patient taking differently: Take 30 mg by mouth Every 4 (Four) Hours As Needed for Moderate Pain . Take 1-2 by mouth every 4-6 hours as needed for pain) 125 tablet 0   • oxyCODONE-acetaminophen (PERCOCET)  MG per tablet Take 1 tablet by mouth Every 6 (Six) Hours As Needed for Moderate Pain .     • rizatriptan (MAXALT) 10 MG tablet Take 10 mg by mouth 1 (One) Time As Needed for Migraine. May repeat in 2 hours if needed         Vital Signs:  Afebrile HR 95 O2 99% /69  Physical Exam:    CV:  S1S2 regular rate and rhythm, no murmur               Resp:  Coarse to auscultation, diminished in bases; respirations regular, even and unlabored    Results Review:    I reviewed the patient's new clinical results.    Cancer Staging (if  applicable)  Cancer Patient: _x_ yes __no __unknown; If yes, clinical stage T:__ N:__M:__, stage group or __N/A    Assessment/Plan:    Metastatic small cell lung cancer - Port placement    Radha Horne, APRN  12/18/2017   8:12 AM

## 2017-12-18 NOTE — H&P (VIEW-ONLY)
12/11/2017  Patient Information  Lorri Bedoya                                                                                          [address]  [place] [address]   1940  'PCP/Referring Physician'  Isidoro Zaidi MD  [phone]  Dillan Beltrán MD  [phone]  Chief Complaint   Patient presents with   • Consult     NP per Dr. Beltrán for Port Placement for Lung Cancer       History of Present Illness:  Patient is referred to me to evaluate for Txnnka-[id number]-[id number] placement.  She had undergone previously a wedge resection of the left lower lobe lesion at another hospital in July, 2017 and apparently had rapid recurrence and/or metastasis.  She is known to have metastatic disease to the bone also and she has significant complaint from compression fractures of the spine and is referred to see me for evaluation for an [id number]-g-Htxv placement for chemotherapy.  At this time she says that her back is in constant pain.      Patient Active Problem List   Diagnosis   • Small cell carcinoma of left lung   • Metastatic cancer to spine     Past Medical History:   Diagnosis Date   • Lung cancer      Past Surgical History:   Procedure Laterality Date   • BRAIN SURGERY      [facility]  Brain bleed caused from a fall   • COLONOSCOPY     • LUNG BIOPSY Left 06/2017    [facility]   • LUNG CANCER SURGERY Left 07/2017    [facility]  [facility]       Current Outpatient Prescriptions:   •  ALPRAZolam (XANAX) 0.5 MG tablet, Take 0.5 mg by mouth 3 (Three) Times a Day As Needed for Anxiety., Disp: , Rfl:   •  butalbital-acetaminophen-caffeine (FIORICET, ESGIC) -40 MG per tablet, Take 1 tablet by mouth 2 (Two) Times a Day., Disp: , Rfl:   •  DIPHENHYDRAMINE HCL, SLEEP, PO, Take  by mouth. 50 Mg as needed, Disp: , Rfl:   •  oxyCODONE (ROXICODONE) 30 MG immediate release tablet, Take 1-2 by mouth every 4-6 hours as needed for pain, Disp: 125 tablet, Rfl: 0  •  oxyCODONE-acetaminophen (PERCOCET)  MG per  tablet, Take 1 tablet by mouth Every 6 (Six) Hours As Needed for Moderate Pain ., Disp: , Rfl:   •  rizatriptan (MAXALT) 10 MG tablet, Take 10 mg by mouth 1 (One) Time As Needed for Migraine. May repeat in 2 hours if needed, Disp: , Rfl:   •  cyclobenzaprine (FLEXERIL) 10 MG tablet, Take 10 mg by mouth As Needed for Muscle Spasms., Disp: , Rfl:   •  gabapentin (NEURONTIN) 600 MG tablet, Take 1,200 mg by mouth 3 (Three) Times a Day., Disp: , Rfl:   •  ibuprofen (ADVIL,MOTRIN) 800 MG tablet, , Disp: , Rfl: 1  •  oxyCODONE-acetaminophen (PERCOCET)  MG per tablet, Take 1 tablet by mouth Every 6 (Six) Hours As Needed for Moderate Pain ., Disp: , Rfl:   No Known Allergies  Social History     Social History   • Marital status:      Spouse name: N/A   • Number of children: 2   • Years of education: N/A     Occupational History   • Bookeeper      Retired     Social History Main Topics   • Smoking status: Current Every Day Smoker     Packs/day: 0.25     Years: 60.00     Types: Cigarettes   • Smokeless tobacco: Never Used      Comment: As much as 1 1/2 PPD Prev.    • Alcohol use No   • Drug use: No   • Sexual activity: Not on file     Other Topics Concern   • Not on file     Social History Narrative     Family History   Problem Relation Age of Onset   • Lung disease Father    • Lymphoma Sister      Review of Systems   Constitution: Positive for weight loss (10 lbs this year). Negative for chills, fever, malaise/fatigue and night sweats.   HENT: Negative for hearing loss, odynophagia and sore throat.    Cardiovascular: Positive for dyspnea on exertion and leg swelling. Negative for chest pain, orthopnea and palpitations.   Respiratory: Negative for cough and hemoptysis.    Endocrine: Negative for cold intolerance, heat intolerance, polydipsia, polyphagia and polyuria.   Hematologic/Lymphatic: Bruises/bleeds easily.   Skin: Negative for itching and rash.   Musculoskeletal: Positive for back pain. Negative for joint  "pain, joint swelling and myalgias.   Gastrointestinal: Positive for constipation. Negative for abdominal pain, diarrhea, hematemesis, hematochezia, melena, nausea and vomiting.   Genitourinary: Negative for dysuria, frequency and hematuria.   Neurological: Negative for focal weakness, headaches, numbness and seizures.   Psychiatric/Behavioral: Negative for suicidal ideas.   All other systems reviewed and are negative.    Vitals:    12/11/17 0820   BP: 160/80   BP Location: Left arm   Patient Position: Sitting   Pulse: 102   Temp: 99.1 °F (37.3 °C)   Weight: 50.3 kg (111 lb)   Height: 162.6 cm (64\")      Physical Exam   CONSTITUTIONAL: Alert and conversant, Well dressed, in a wheelchair clearly uncomfortable complaining of back pain  EYES: Sclera clean, Anicteric, Pupils equal  ENT: No nasal deviation, Trachea midline  NECK: No neck masses, Supple  LUNGS: No wheezing, Cough, non-congested  HEART: No rubs, No murmurs  GASTROINTESTINAL: Soft, non-distended, No masses, Non tender  to palpation, normal bowel sounds  NEURO: No motor deficits, No sensory deficits, Cranial Nerves 2 through 12 grossly intact  PSYCHIATRIC: Oriented to person, place and time, No memory deficits, Mood appropriate  VASCULAR: No carotid bruits, Femoral pulses palpable and symmetric  MUSKULOSKELETAL: No extremity trauma or extremity asymmetry    Labs/Imaging:   I reviewed the PET scan report demonstrating the metastatic lesion.    Assessment/Plan:   Metastatic small cell cancer will require chemotherapy and I went over that with the patient and her caregiver today.  She is aware of the planned procedure for a port placement.  She understands that this is a surgical procedure that requires an incision.  The risks of pneumothorax, bleeding and device failure and infection have been explained and she agrees to proceed.       Patient Active Problem List   Diagnosis   • Small cell carcinoma of left lung   • Metastatic cancer to spine     Signed " by: Emir Marcos M.D.    12/11/17    CC:  MD Dillan Díaz MD Debbie Moore, , editing for Emir Marcos M.D.    I, Emir Marcos MD, have read and agree with the editing done by Amanda Jones, .

## 2017-12-19 ENCOUNTER — APPOINTMENT (OUTPATIENT)
Dept: ONCOLOGY | Facility: HOSPITAL | Age: 77
End: 2017-12-19

## 2017-12-19 ENCOUNTER — APPOINTMENT (OUTPATIENT)
Dept: GENERAL RADIOLOGY | Facility: HOSPITAL | Age: 77
End: 2017-12-19

## 2017-12-19 ENCOUNTER — HOSPITAL ENCOUNTER (INPATIENT)
Facility: HOSPITAL | Age: 77
LOS: 2 days | Discharge: HOSPICE | End: 2017-12-22
Attending: HOSPITALIST | Admitting: INTERNAL MEDICINE

## 2017-12-19 ENCOUNTER — TELEPHONE (OUTPATIENT)
Dept: ONCOLOGY | Facility: CLINIC | Age: 77
End: 2017-12-19

## 2017-12-19 PROBLEM — R09.02 HYPOXIA: Status: ACTIVE | Noted: 2017-12-19

## 2017-12-19 PROBLEM — Z72.0 TOBACCO ABUSE: Status: ACTIVE | Noted: 2017-12-19

## 2017-12-19 PROBLEM — E83.39 HYPOPHOSPHATEMIA: Status: ACTIVE | Noted: 2017-12-19

## 2017-12-19 PROBLEM — C34.90 LUNG CANCER (HCC): Status: ACTIVE | Noted: 2017-12-18

## 2017-12-19 PROBLEM — M54.59 INTRACTABLE LOW BACK PAIN: Status: ACTIVE | Noted: 2017-12-19

## 2017-12-19 PROBLEM — E87.6 HYPOKALEMIA: Status: ACTIVE | Noted: 2017-12-19

## 2017-12-19 PROBLEM — R53.1 GENERALIZED WEAKNESS: Status: ACTIVE | Noted: 2017-12-19

## 2017-12-19 PROBLEM — G93.40 ENCEPHALOPATHY: Status: ACTIVE | Noted: 2017-12-19

## 2017-12-19 LAB
ALBUMIN SERPL-MCNC: 3.5 G/DL (ref 3.2–4.8)
ALBUMIN/GLOB SERPL: 1.3 G/DL (ref 1.5–2.5)
ALP SERPL-CCNC: 144 U/L (ref 25–100)
ALT SERPL W P-5'-P-CCNC: 15 U/L (ref 7–40)
ANION GAP SERPL CALCULATED.3IONS-SCNC: 5 MMOL/L (ref 3–11)
AST SERPL-CCNC: 37 U/L (ref 0–33)
BASOPHILS # BLD AUTO: 0.02 10*3/MM3 (ref 0–0.2)
BASOPHILS NFR BLD AUTO: 0.3 % (ref 0–1)
BILIRUB SERPL-MCNC: 0.9 MG/DL (ref 0.3–1.2)
BNP SERPL-MCNC: 779 PG/ML (ref 0–100)
BUN BLD-MCNC: 11 MG/DL (ref 9–23)
BUN/CREAT SERPL: 22 (ref 7–25)
CALCIUM SPEC-SCNC: 9.7 MG/DL (ref 8.7–10.4)
CHLORIDE SERPL-SCNC: 106 MMOL/L (ref 99–109)
CO2 SERPL-SCNC: 34 MMOL/L (ref 20–31)
CREAT BLD-MCNC: 0.5 MG/DL (ref 0.6–1.3)
DEPRECATED RDW RBC AUTO: 74 FL (ref 37–54)
EOSINOPHIL # BLD AUTO: 0.01 10*3/MM3 (ref 0–0.3)
EOSINOPHIL NFR BLD AUTO: 0.2 % (ref 0–3)
ERYTHROCYTE [DISTWIDTH] IN BLOOD BY AUTOMATED COUNT: 20.2 % (ref 11.3–14.5)
GFR SERPL CREATININE-BSD FRML MDRD: 119 ML/MIN/1.73
GLOBULIN UR ELPH-MCNC: 2.7 GM/DL
GLUCOSE BLD-MCNC: 136 MG/DL (ref 70–100)
HCT VFR BLD AUTO: 46 % (ref 34.5–44)
HGB BLD-MCNC: 13.9 G/DL (ref 11.5–15.5)
IMM GRANULOCYTES # BLD: 0.09 10*3/MM3 (ref 0–0.03)
IMM GRANULOCYTES NFR BLD: 1.4 % (ref 0–0.6)
LYMPHOCYTES # BLD AUTO: 0.81 10*3/MM3 (ref 0.6–4.8)
LYMPHOCYTES NFR BLD AUTO: 12.4 % (ref 24–44)
MAGNESIUM SERPL-MCNC: 1.8 MG/DL (ref 1.3–2.7)
MCH RBC QN AUTO: 31.2 PG (ref 27–31)
MCHC RBC AUTO-ENTMCNC: 30.2 G/DL (ref 32–36)
MCV RBC AUTO: 103.4 FL (ref 80–99)
MONOCYTES # BLD AUTO: 0.72 10*3/MM3 (ref 0–1)
MONOCYTES NFR BLD AUTO: 11 % (ref 0–12)
NEUTROPHILS # BLD AUTO: 4.88 10*3/MM3 (ref 1.5–8.3)
NEUTROPHILS NFR BLD AUTO: 74.7 % (ref 41–71)
PHOSPHATE SERPL-MCNC: 2.8 MG/DL (ref 2.4–5.1)
PLATELET # BLD AUTO: 60 10*3/MM3 (ref 150–450)
POTASSIUM BLD-SCNC: 3.4 MMOL/L (ref 3.5–5.5)
PROT SERPL-MCNC: 6.2 G/DL (ref 5.7–8.2)
RBC # BLD AUTO: 4.45 10*6/MM3 (ref 3.89–5.14)
SODIUM BLD-SCNC: 145 MMOL/L (ref 132–146)
WBC NRBC COR # BLD: 6.53 10*3/MM3 (ref 3.5–10.8)

## 2017-12-19 PROCEDURE — G0378 HOSPITAL OBSERVATION PER HR: HCPCS

## 2017-12-19 PROCEDURE — 99220 PR INITIAL OBSERVATION CARE/DAY 70 MINUTES: CPT | Performed by: HOSPITALIST

## 2017-12-19 PROCEDURE — 83880 ASSAY OF NATRIURETIC PEPTIDE: CPT | Performed by: NURSE PRACTITIONER

## 2017-12-19 PROCEDURE — 02HV33Z INSERTION OF INFUSION DEVICE INTO SUPERIOR VENA CAVA, PERCUTANEOUS APPROACH: ICD-10-PCS | Performed by: HOSPITALIST

## 2017-12-19 PROCEDURE — 80053 COMPREHEN METABOLIC PANEL: CPT | Performed by: NURSE PRACTITIONER

## 2017-12-19 PROCEDURE — C1751 CATH, INF, PER/CENT/MIDLINE: HCPCS

## 2017-12-19 PROCEDURE — 74000 HC ABDOMEN KUB: CPT

## 2017-12-19 PROCEDURE — 94799 UNLISTED PULMONARY SVC/PX: CPT

## 2017-12-19 PROCEDURE — C1894 INTRO/SHEATH, NON-LASER: HCPCS

## 2017-12-19 PROCEDURE — 84100 ASSAY OF PHOSPHORUS: CPT | Performed by: NURSE PRACTITIONER

## 2017-12-19 PROCEDURE — 84132 ASSAY OF SERUM POTASSIUM: CPT | Performed by: HOSPITALIST

## 2017-12-19 PROCEDURE — 25010000002 HEPARIN (PORCINE) PER 1000 UNITS: Performed by: NURSE PRACTITIONER

## 2017-12-19 PROCEDURE — 25010000002 DEXAMETHASONE PER 1 MG: Performed by: FAMILY MEDICINE

## 2017-12-19 PROCEDURE — 85025 COMPLETE CBC W/AUTO DIFF WBC: CPT | Performed by: NURSE PRACTITIONER

## 2017-12-19 PROCEDURE — 25010000002 HYDROMORPHONE PER 4 MG: Performed by: FAMILY MEDICINE

## 2017-12-19 PROCEDURE — 99223 1ST HOSP IP/OBS HIGH 75: CPT | Performed by: INTERNAL MEDICINE

## 2017-12-19 PROCEDURE — 94640 AIRWAY INHALATION TREATMENT: CPT

## 2017-12-19 PROCEDURE — 83735 ASSAY OF MAGNESIUM: CPT | Performed by: NURSE PRACTITIONER

## 2017-12-19 RX ORDER — IPRATROPIUM BROMIDE AND ALBUTEROL SULFATE 2.5; .5 MG/3ML; MG/3ML
3 SOLUTION RESPIRATORY (INHALATION)
Status: DISCONTINUED | OUTPATIENT
Start: 2017-12-19 | End: 2017-12-22 | Stop reason: HOSPADM

## 2017-12-19 RX ORDER — MAGNESIUM SULFATE HEPTAHYDRATE 40 MG/ML
4 INJECTION, SOLUTION INTRAVENOUS AS NEEDED
Status: DISCONTINUED | OUTPATIENT
Start: 2017-12-19 | End: 2017-12-22 | Stop reason: HOSPADM

## 2017-12-19 RX ORDER — HEPARIN SODIUM 5000 [USP'U]/ML
5000 INJECTION, SOLUTION INTRAVENOUS; SUBCUTANEOUS EVERY 12 HOURS SCHEDULED
Status: DISCONTINUED | OUTPATIENT
Start: 2017-12-19 | End: 2017-12-20

## 2017-12-19 RX ORDER — OXYCODONE HYDROCHLORIDE 15 MG/1
30 TABLET ORAL EVERY 4 HOURS PRN
Status: DISCONTINUED | OUTPATIENT
Start: 2017-12-19 | End: 2017-12-22 | Stop reason: HOSPADM

## 2017-12-19 RX ORDER — SODIUM CHLORIDE 0.9 % (FLUSH) 0.9 %
10 SYRINGE (ML) INJECTION AS NEEDED
Status: DISCONTINUED | OUTPATIENT
Start: 2017-12-19 | End: 2017-12-22 | Stop reason: HOSPADM

## 2017-12-19 RX ORDER — SODIUM CHLORIDE 450 MG/100ML
50 INJECTION, SOLUTION INTRAVENOUS CONTINUOUS
Status: DISCONTINUED | OUTPATIENT
Start: 2017-12-19 | End: 2017-12-20

## 2017-12-19 RX ORDER — HYDROMORPHONE HYDROCHLORIDE 1 MG/ML
1 INJECTION, SOLUTION INTRAMUSCULAR; INTRAVENOUS; SUBCUTANEOUS
Status: DISCONTINUED | OUTPATIENT
Start: 2017-12-19 | End: 2017-12-20

## 2017-12-19 RX ORDER — ACETAMINOPHEN 325 MG/1
650 TABLET ORAL EVERY 6 HOURS PRN
Status: DISCONTINUED | OUTPATIENT
Start: 2017-12-19 | End: 2017-12-19

## 2017-12-19 RX ORDER — POTASSIUM CHLORIDE 750 MG/1
40 CAPSULE, EXTENDED RELEASE ORAL AS NEEDED
Status: DISCONTINUED | OUTPATIENT
Start: 2017-12-19 | End: 2017-12-22 | Stop reason: HOSPADM

## 2017-12-19 RX ORDER — ALPRAZOLAM 0.25 MG/1
0.25 TABLET ORAL 3 TIMES DAILY PRN
Status: DISCONTINUED | OUTPATIENT
Start: 2017-12-19 | End: 2017-12-20

## 2017-12-19 RX ORDER — OXYCODONE AND ACETAMINOPHEN 7.5; 325 MG/1; MG/1
1 TABLET ORAL EVERY 6 HOURS PRN
Status: DISCONTINUED | OUTPATIENT
Start: 2017-12-19 | End: 2017-12-22 | Stop reason: HOSPADM

## 2017-12-19 RX ORDER — MAGNESIUM SULFATE HEPTAHYDRATE 40 MG/ML
2 INJECTION, SOLUTION INTRAVENOUS AS NEEDED
Status: DISCONTINUED | OUTPATIENT
Start: 2017-12-19 | End: 2017-12-22 | Stop reason: HOSPADM

## 2017-12-19 RX ORDER — POTASSIUM CHLORIDE 1.5 G/1.77G
40 POWDER, FOR SOLUTION ORAL AS NEEDED
Status: DISCONTINUED | OUTPATIENT
Start: 2017-12-19 | End: 2017-12-22 | Stop reason: HOSPADM

## 2017-12-19 RX ORDER — NICOTINE 21 MG/24HR
1 PATCH, TRANSDERMAL 24 HOURS TRANSDERMAL
Status: DISCONTINUED | OUTPATIENT
Start: 2017-12-19 | End: 2017-12-22 | Stop reason: HOSPADM

## 2017-12-19 RX ORDER — POTASSIUM CHLORIDE 7.45 MG/ML
10 INJECTION INTRAVENOUS
Status: DISCONTINUED | OUTPATIENT
Start: 2017-12-19 | End: 2017-12-22 | Stop reason: HOSPADM

## 2017-12-19 RX ORDER — DEXAMETHASONE SODIUM PHOSPHATE 4 MG/ML
4 INJECTION, SOLUTION INTRA-ARTICULAR; INTRALESIONAL; INTRAMUSCULAR; INTRAVENOUS; SOFT TISSUE ONCE
Status: COMPLETED | OUTPATIENT
Start: 2017-12-19 | End: 2017-12-19

## 2017-12-19 RX ORDER — SODIUM CHLORIDE 0.9 % (FLUSH) 0.9 %
1-10 SYRINGE (ML) INJECTION AS NEEDED
Status: DISCONTINUED | OUTPATIENT
Start: 2017-12-19 | End: 2017-12-22 | Stop reason: HOSPADM

## 2017-12-19 RX ORDER — ONDANSETRON 2 MG/ML
4 INJECTION INTRAMUSCULAR; INTRAVENOUS EVERY 6 HOURS PRN
Status: DISCONTINUED | OUTPATIENT
Start: 2017-12-19 | End: 2017-12-22 | Stop reason: HOSPADM

## 2017-12-19 RX ADMIN — HEPARIN SODIUM 5000 UNITS: 5000 INJECTION, SOLUTION INTRAVENOUS; SUBCUTANEOUS at 20:07

## 2017-12-19 RX ADMIN — NICOTINE 1 PATCH: 21 PATCH, EXTENDED RELEASE TRANSDERMAL at 15:46

## 2017-12-19 RX ADMIN — DEXAMETHASONE SODIUM PHOSPHATE 4 MG: 4 INJECTION, SOLUTION INTRAMUSCULAR; INTRAVENOUS at 16:27

## 2017-12-19 RX ADMIN — SODIUM CHLORIDE 50 ML/HR: 4.5 INJECTION, SOLUTION INTRAVENOUS at 15:46

## 2017-12-19 RX ADMIN — ALPRAZOLAM 0.25 MG: 0.25 TABLET ORAL at 16:27

## 2017-12-19 RX ADMIN — IPRATROPIUM BROMIDE AND ALBUTEROL SULFATE 3 ML: .5; 3 SOLUTION RESPIRATORY (INHALATION) at 21:27

## 2017-12-19 RX ADMIN — POTASSIUM CHLORIDE 40 MEQ: 750 CAPSULE, EXTENDED RELEASE ORAL at 15:46

## 2017-12-19 RX ADMIN — HYDROMORPHONE HYDROCHLORIDE 1 MG: 1 INJECTION, SOLUTION INTRAMUSCULAR; INTRAVENOUS; SUBCUTANEOUS at 16:26

## 2017-12-19 RX ADMIN — MAGNESIUM SULFATE HEPTAHYDRATE 4 G: 40 INJECTION, SOLUTION INTRAVENOUS at 15:46

## 2017-12-19 RX ADMIN — IPRATROPIUM BROMIDE AND ALBUTEROL SULFATE 3 ML: .5; 3 SOLUTION RESPIRATORY (INHALATION) at 16:20

## 2017-12-19 RX ADMIN — OXYCODONE HYDROCHLORIDE 30 MG: 15 TABLET ORAL at 15:46

## 2017-12-19 RX ADMIN — POTASSIUM CHLORIDE 40 MEQ: 750 CAPSULE, EXTENDED RELEASE ORAL at 20:07

## 2017-12-19 NOTE — TELEPHONE ENCOUNTER
----- Message from Leticia Peck sent at 12/18/2017 10:05 AM EST -----  Regarding: [facility] - PORT PLACEMENT   Contact: [phone]  SULMA, DAUGHTER CALLED AND SAID PATIENT WASN'T ABLE TO GET PORT PLACEMENT THIS MORNING DUE TO HER POTASSIUM WAS TOO LOW. SHE IS SCHEDULED TO START CHEMO TOMORROW, BUT THEY ARE ALSO WANTING HER TO COME BACK IN THE MORNING TO TRY TO GET THE PORT IN.     HER ANKLES HAVE BEEN VERY SWOLLEN, AND SHE IS ALSO VERY CONFUSED. SHE TALKING OFF THE WALL.     COULD SHE ALLERGIC TO THE MORPHINE OR OTHER MEDS?

## 2017-12-19 NOTE — PLAN OF CARE
Problem: Patient Care Overview (Adult)  Goal: Plan of Care Review  Outcome: Ongoing (interventions implemented as appropriate)    12/19/17 1716   Coping/Psychosocial Response Interventions   Plan Of Care Reviewed With patient;daughter   Patient Care Overview   Progress improving   Outcome Evaluation   Outcome Summary/Follow up Plan on IVF and pain medicines, pain under conrtol now, electrolytes replaced.         Problem: Pain, Acute (Adult)  Goal: Identify Related Risk Factors and Signs and Symptoms  Outcome: Ongoing (interventions implemented as appropriate)    12/19/17 1716   Pain, Acute   Related Risk Factors (Acute Pain) disease process   Signs and Symptoms (Acute Pain) fatigue/weakness       Goal: Acceptable Pain Control/Comfort Level  Outcome: Ongoing (interventions implemented as appropriate)    12/19/17 1716   Pain, Acute (Adult)   Acceptable Pain Control/Comfort Level making progress toward outcome         Problem: Fall Risk (Adult)  Goal: Identify Related Risk Factors and Signs and Symptoms  Outcome: Ongoing (interventions implemented as appropriate)    12/19/17 1716   Fall Risk   Fall Risk: Related Risk Factors age-related changes   Fall Risk: Signs and Symptoms presence of risk factors       Goal: Absence of Falls  Outcome: Ongoing (interventions implemented as appropriate)    12/19/17 1716   Fall Risk (Adult)   Absence of Falls making progress toward outcome

## 2017-12-19 NOTE — H&P
[facility]  HISTORY AND PHYSICAL    Patient Name: Lorri Bedoya  : 1940  MRN: [id number]  Primary Care Physician: Isidoro Zaidi MD    Subjective   Subjective     Chief Complaint:  Confusion / Weakness    HPI:  Lorri Bedoya is a 77 y.o. female with PMH significant for tobacco abuse that was found to have new diagnosis of Small cell lung carcinoma. She underwent left thoracotomy and left wedge resection in 2017 with rapid reoccurrence and found to have metastatic disease to the spine with increasing low back pain in the fall. She underwent kyphoplasty to L3 lesion 17 and was found to have complete lesion of L1 afterward with inability to repair due to a posterior disc bulge threatening spinal cord. Patient underwent palliative radiation to the spine and then referred to Dr. Beltrán for further evaluation and management earlier this month.     PET imaging from 1317 reveals too numerous to count bone metastases, left lung and pleural abnormality, and a liver lesion. MRI brain  shows only chronic changes. Patient recently moved in with family near by [place] from [place] due to inability to care for her self and progressing illness. She was scheduled have Port placed in chest with Dr. Marcos, but was severely hypokalemic and unable to proceed. In the interim patient becoming weaker and sent for inpatient admission per Dr. Jorge's request. Patient's current labs pending.     Per patient and family- more confusion, lethargy and slow responsiveness within the past week. Decreased appetite/ fatigue. No n/v/d. No complaints of soa/ wheezine. Continues to smoke 1 PPD and states she will not quit. + lower extremity edema x 3-4 weeks.    Review of Systems   Constitutional: Positive for activity change, appetite change and unexpected weight change.   HENT: Negative.    Eyes: Negative.    Respiratory: Negative.    Cardiovascular: Positive  for leg swelling.   Gastrointestinal: Negative.    Genitourinary: Negative.    Musculoskeletal: Positive for back pain.   Skin: Negative.    Neurological: Positive for weakness.   Psychiatric/Behavioral: Positive for confusion.          Otherwise 10-system ROS reviewed and is negative except as mentioned in the HPI.    Personal History     Past Medical History:   Diagnosis Date   • Back pain    • Lung cancer    • Migraine    Small Cell Lung Cancer Extensive Stage  Chronic Migraines   Tobacco Use Disorder - smoker for 59 years    Past Surgical History:   Procedure Laterality Date   • BRAIN SURGERY      [facility]  Brain bleed caused from a fall   • COLONOSCOPY      4 years ago   • FRACTURE SURGERY      lumbar spine   • LUNG BIOPSY Left 06/2017    [facility]   • LUNG CANCER SURGERY Left 07/2017    [facility]  [facility]       Family History: family history includes Lung disease in her father; Lymphoma in her sister.     Social History:  reports that she has been smoking Cigarettes.  She has a 60.00 pack-year smoking history. She has never used smokeless tobacco. She reports that she does not drink alcohol or use illicit drugs.    Medications:  Prescriptions Prior to Admission   Medication Sig Dispense Refill Last Dose   • ALPRAZolam (XANAX) 0.5 MG tablet Take 0.5 mg by mouth 3 (Three) Times a Day As Needed for Anxiety.   12/18/2017 at Unknown time   • oxyCODONE (ROXICODONE) 30 MG immediate release tablet Take 1-2 by mouth every 4-6 hours as needed for pain (Patient taking differently: Take 30 mg by mouth Every 4 (Four) Hours As Needed for Moderate Pain . Take 1-2 by mouth every 4-6 hours as needed for pain) 125 tablet 0 12/19/2017 at Unknown time   • oxyCODONE-acetaminophen (PERCOCET)  MG per tablet Take 1 tablet by mouth Every 6 (Six) Hours As Needed for Moderate Pain .   12/19/2017 at Unknown time   • butalbital-acetaminophen-caffeine (FIORICET, ESGIC) -40 MG per tablet Take 1 tablet by mouth  As Needed for Headache or Migraine.   Unknown at Unknown time   • DIPHENHYDRAMINE HCL, SLEEP, PO Take 50 mg by mouth Every Night. 50 Mg as needed    Unknown at Unknown time   • gabapentin (NEURONTIN) 600 MG tablet Take 1,200 mg by mouth 3 (Three) Times a Day.   Unknown at Unknown time   • ibuprofen (ADVIL,MOTRIN) 800 MG tablet Take 800 mg by mouth Every 6 (Six) Hours As Needed for Moderate Pain .  1 Unknown at Unknown time   • lidocaine-prilocaine (EMLA) 2.5-2.5 % cream Apply  topically As Needed.   Unknown at Unknown time   • Morphine (MS CONTIN) 15 MG 12 hr tablet Take 1 tablet by mouth 2 (Two) Times a Day for 30 days. 1 tablet Q12H 60 tablet 0 12/18/2017 at 0600   • rizatriptan (MAXALT) 10 MG tablet Take 10 mg by mouth 1 (One) Time As Needed for Migraine. May repeat in 2 hours if needed   Unknown at Unknown time       No Known Allergies    Objective   Objective     Vital Signs:   Temp:  [97.7 °F (36.5 °C)] 97.7 °F (36.5 °C)  Heart Rate:  [87] 87  Resp:  [16] 16  BP: (143)/(71) 143/71        Physical Exam   Constitutional: No acute distress, awake but falls asleep throughout exam, frail, chronically ill appearing  Eyes: PERRLA, sclerae anicteric, no conjunctival injection  HENT: NCAT, mucous membranes moist  Neck: Supple, no thyromegaly, no lymphadenopathy, trachea midline  Respiratory: Coarse bilateral bases and slightly diminished,  nonlabored respirations   Cardiovascular: RRR, no murmurs, rubs, or gallops, palpable pedal pulses bilaterally  Gastrointestinal: Positive bowel sounds, soft, nontender, nondistended  Musculoskeletal: trace bilateral ankle edema, no clubbing or cyanosis to extremities  Psychiatric: Appropriate affect, cooperative  Neurologic: Oriented x 3- but drowsy with slow response, strength symmetric in all extremities, Cranial Nerves grossly intact to confrontation, speech clear  Skin: No rashes      Results Reviewed:  I have personally reviewed current lab, radiology, and data and  agree.      Results from last 7 days  Lab Units 12/19/17  1355 12/17/17  1348   WBC 10*3/mm3 6.53 7.80   HEMOGLOBIN g/dL 13.9 15.5   HEMATOCRIT % 46.0* 48.8*   PLATELETS 10*3/mm3 60* 67*   INR   --  1.10       Results from last 7 days  Lab Units 12/18/17  0804 12/17/17  1348 12/15/17  1019   SODIUM mmol/L  --  145 142   POTASSIUM mmol/L 2.7* 2.8* 2.8*   CHLORIDE mmol/L  --  98* 97*   CO2 mmol/L  --  34.0* 32.0*   BUN mg/dL  --  14 19   CREATININE mg/dL  --  0.60 0.70   GLUCOSE mg/dL  --  149* 125*   CALCIUM mg/dL  --  10.4 10.4   ALT (SGPT) U/L  --   --  14   AST (SGOT) U/L  --   --  38*     Brief Urine Lab Results     None        No results found for: BNP  No results found for: PHART  Imaging Results (last 24 hours)     ** No results found for the last 24 hours. **             Assessment/Plan   Assessment / Plan     Hospital Problem List     Small cell carcinoma of left lung    Metastatic cancer to spine    Hypoxia    Tobacco abuse    Encephalopathy    Intractable low back pain    Generalized weakness    Hypokalemia    Hypophosphatemia            Assessment & Plan:  -- Encephalopathy likely multifactorial: SCL CA with hypoxia, acute/ chronic pain with increase in narcotic use- also likely contributing causing increased lethargy/ hypoxia.   -- consult palliative medicine for pain management  -- monitor dosing of narcotics/ narcan prn  -- supplemental O2 (not on home O2)  -- IS/ nebs scheduled and prn  -- on going tobacco abuse- cessation counseling, nicotine patch  -- Dr. Beltrán to see this evening. Picc line to be placed. Plans to start chemo soon  -- electrolyte replacement, recheck labs in am  -- fall risk, PT/OT consult  -- check bnp for recent lower ext edema. No history of echo per patient report. +/-    DVT prophylaxis:heparin sq    CODE STATUS:  Full Code    Admission Status:  I believe this patient meets INPATIENT status due to the need for care which can only be reasonably provided in an hospital setting  such as aggressive/expedited ancillary services and/or consultation services, the necessity for IV medications, close physician monitoring and/or the possible need for procedures.  In such, I feel patient’s risk for adverse outcomes and need for care warrant INPATIENT evaluation and predict the patient’s care encounter to likely last beyond 2 midnights.      Jeri HORTAAlban Stoner, APRN   12/19/17   2:28 PM     Brief Attending Admission Attestation     I have seen and examined the patient, performing an independent face-to-face diagnostic evaluation with plan of care reviewed and developed with the advanced practice clinician (APC).      Brief Summary Statement/HPI:   Lorri Bedoya is a 77 y.o. female who presented as a direct admission for pain and not feeling well.  She has extensive stage Small Cell Lung Cancer with widespread mets and appears to be in pain currently.   She has been a smoker for 59 years she says.    Attending Physical Exam:    Constitutional: No acute distress, awake, alert, wasted appearance  HENT: NCAT, very dry tongue and dry MM  Respiratory: Clear to auscultation bilaterally, poor inspiratory effort  Cardiovascular: RRR, s1 and s2  Gastrointestinal: Positive bowel sounds, soft, very tender abdomen to superficial palpation, no guarding  Musculoskeletal: No pedal edema  Psychiatric: weak, cooperative  Neurologic: Oriented x 3, generalized weakness  Skin: dry, + skin tenting    Brief Assessment/Plan :  See above for further detailed assessment and plan developed with APC which I have reviewed and/or edited.    77 year old female with extensive stage Small Cell Lung Cancer who appears weak and dehydrated.  Dr. Beltrán to see regarding chemotherapy.    PICC line ordered  Gentle IV fluids for now  Abdominal XR  Replace electrolytes today    I believe this patient meets observation     Emir Warner MD  12/19/17  3:12 PM

## 2017-12-19 NOTE — PLAN OF CARE
Problem: Patient Care Overview (Adult)  Goal: Plan of Care Review  Outcome: Ongoing (interventions implemented as appropriate)    12/19/17 9555   Coping/Psychosocial Response Interventions   Plan Of Care Reviewed With patient   Patient Care Overview   Progress no change   Outcome Evaluation   Outcome Summary/Follow up Plan new palliative consult for pain. pt having excruciating back pain unrelieved by oxycodone. dexamethasone and iv dilaudid ordered by Dr carreon

## 2017-12-20 ENCOUNTER — APPOINTMENT (OUTPATIENT)
Dept: ONCOLOGY | Facility: HOSPITAL | Age: 77
End: 2017-12-20

## 2017-12-20 PROBLEM — R60.0 LOWER EXTREMITY EDEMA: Status: ACTIVE | Noted: 2017-12-20

## 2017-12-20 PROBLEM — C79.9 METASTATIC CANCER (HCC): Status: ACTIVE | Noted: 2017-12-20

## 2017-12-20 PROBLEM — M89.8X9 MALIGNANT BONE PAIN: Status: ACTIVE | Noted: 2017-12-20

## 2017-12-20 PROBLEM — D69.6 THROMBOCYTOPENIA (HCC): Status: ACTIVE | Noted: 2017-12-20

## 2017-12-20 PROBLEM — G89.3 MALIGNANT BONE PAIN: Status: ACTIVE | Noted: 2017-12-20

## 2017-12-20 LAB
ANION GAP SERPL CALCULATED.3IONS-SCNC: 5 MMOL/L (ref 3–11)
BUN BLD-MCNC: 9 MG/DL (ref 9–23)
BUN/CREAT SERPL: 18 (ref 7–25)
CALCIUM SPEC-SCNC: 9.2 MG/DL (ref 8.7–10.4)
CHLORIDE SERPL-SCNC: 105 MMOL/L (ref 99–109)
CO2 SERPL-SCNC: 29 MMOL/L (ref 20–31)
CREAT BLD-MCNC: 0.5 MG/DL (ref 0.6–1.3)
DEPRECATED RDW RBC AUTO: 72.9 FL (ref 37–54)
ERYTHROCYTE [DISTWIDTH] IN BLOOD BY AUTOMATED COUNT: 19.8 % (ref 11.3–14.5)
GFR SERPL CREATININE-BSD FRML MDRD: 119 ML/MIN/1.73
GLUCOSE BLD-MCNC: 154 MG/DL (ref 70–100)
HCT VFR BLD AUTO: 40.6 % (ref 34.5–44)
HGB BLD-MCNC: 12.7 G/DL (ref 11.5–15.5)
MAGNESIUM SERPL-MCNC: 2.4 MG/DL (ref 1.3–2.7)
MCH RBC QN AUTO: 32 PG (ref 27–31)
MCHC RBC AUTO-ENTMCNC: 31.3 G/DL (ref 32–36)
MCV RBC AUTO: 102.3 FL (ref 80–99)
PHOSPHATE SERPL-MCNC: 3.4 MG/DL (ref 2.4–5.1)
PLATELET # BLD AUTO: 59 10*3/MM3 (ref 150–450)
PMV BLD AUTO: 12.8 FL (ref 6–12)
POTASSIUM BLD-SCNC: 4.5 MMOL/L (ref 3.5–5.5)
POTASSIUM BLD-SCNC: 4.7 MMOL/L (ref 3.5–5.5)
RBC # BLD AUTO: 3.97 10*6/MM3 (ref 3.89–5.14)
SODIUM BLD-SCNC: 139 MMOL/L (ref 132–146)
WBC NRBC COR # BLD: 5.83 10*3/MM3 (ref 3.5–10.8)

## 2017-12-20 PROCEDURE — 83735 ASSAY OF MAGNESIUM: CPT | Performed by: NURSE PRACTITIONER

## 2017-12-20 PROCEDURE — 94640 AIRWAY INHALATION TREATMENT: CPT

## 2017-12-20 PROCEDURE — 80048 BASIC METABOLIC PNL TOTAL CA: CPT | Performed by: HOSPITALIST

## 2017-12-20 PROCEDURE — 94799 UNLISTED PULMONARY SVC/PX: CPT

## 2017-12-20 PROCEDURE — 25010000002 HEPARIN (PORCINE) PER 1000 UNITS: Performed by: NURSE PRACTITIONER

## 2017-12-20 PROCEDURE — 25010000002 HYDROMORPHONE PER 4 MG: Performed by: FAMILY MEDICINE

## 2017-12-20 PROCEDURE — 84100 ASSAY OF PHOSPHORUS: CPT | Performed by: NURSE PRACTITIONER

## 2017-12-20 PROCEDURE — 85027 COMPLETE CBC AUTOMATED: CPT | Performed by: HOSPITALIST

## 2017-12-20 PROCEDURE — 94760 N-INVAS EAR/PLS OXIMETRY 1: CPT

## 2017-12-20 PROCEDURE — 25010000002 KETOROLAC TROMETHAMINE PER 15 MG: Performed by: FAMILY MEDICINE

## 2017-12-20 RX ORDER — BISACODYL 10 MG
10 SUPPOSITORY, RECTAL RECTAL 2 TIMES DAILY PRN
Status: DISCONTINUED | OUTPATIENT
Start: 2017-12-20 | End: 2017-12-22 | Stop reason: HOSPADM

## 2017-12-20 RX ORDER — KETOROLAC TROMETHAMINE 15 MG/ML
15 INJECTION, SOLUTION INTRAMUSCULAR; INTRAVENOUS EVERY 8 HOURS SCHEDULED
Status: DISCONTINUED | OUTPATIENT
Start: 2017-12-20 | End: 2017-12-22 | Stop reason: HOSPADM

## 2017-12-20 RX ORDER — SENNA AND DOCUSATE SODIUM 50; 8.6 MG/1; MG/1
2 TABLET, FILM COATED ORAL 2 TIMES DAILY
Status: DISCONTINUED | OUTPATIENT
Start: 2017-12-20 | End: 2017-12-22 | Stop reason: HOSPADM

## 2017-12-20 RX ORDER — HYDROMORPHONE HYDROCHLORIDE 1 MG/ML
0.5 INJECTION, SOLUTION INTRAMUSCULAR; INTRAVENOUS; SUBCUTANEOUS
Status: DISCONTINUED | OUTPATIENT
Start: 2017-12-20 | End: 2017-12-22 | Stop reason: HOSPADM

## 2017-12-20 RX ORDER — ALPRAZOLAM 0.5 MG/1
0.5 TABLET ORAL EVERY 8 HOURS
Status: DISCONTINUED | OUTPATIENT
Start: 2017-12-20 | End: 2017-12-22 | Stop reason: HOSPADM

## 2017-12-20 RX ADMIN — IPRATROPIUM BROMIDE AND ALBUTEROL SULFATE 3 ML: .5; 3 SOLUTION RESPIRATORY (INHALATION) at 15:53

## 2017-12-20 RX ADMIN — POLYETHYLENE GLYCOL 3350 17 G: 17 POWDER, FOR SOLUTION ORAL at 15:36

## 2017-12-20 RX ADMIN — NICOTINE 1 PATCH: 21 PATCH, EXTENDED RELEASE TRANSDERMAL at 08:33

## 2017-12-20 RX ADMIN — KETOROLAC TROMETHAMINE 15 MG: 15 INJECTION, SOLUTION INTRAMUSCULAR; INTRAVENOUS at 14:00

## 2017-12-20 RX ADMIN — HYDROMORPHONE HYDROCHLORIDE 0.5 MG: 1 INJECTION, SOLUTION INTRAMUSCULAR; INTRAVENOUS; SUBCUTANEOUS at 10:05

## 2017-12-20 RX ADMIN — ALPRAZOLAM 0.25 MG: 0.25 TABLET ORAL at 10:51

## 2017-12-20 RX ADMIN — OXYCODONE HYDROCHLORIDE 30 MG: 15 TABLET ORAL at 08:33

## 2017-12-20 RX ADMIN — HYDROMORPHONE HYDROCHLORIDE 0.5 MG: 1 INJECTION, SOLUTION INTRAMUSCULAR; INTRAVENOUS; SUBCUTANEOUS at 16:59

## 2017-12-20 RX ADMIN — KETOROLAC TROMETHAMINE 15 MG: 15 INJECTION, SOLUTION INTRAMUSCULAR; INTRAVENOUS at 21:41

## 2017-12-20 RX ADMIN — HYDROMORPHONE HYDROCHLORIDE 0.5 MG: 1 INJECTION, SOLUTION INTRAMUSCULAR; INTRAVENOUS; SUBCUTANEOUS at 21:41

## 2017-12-20 RX ADMIN — IPRATROPIUM BROMIDE AND ALBUTEROL SULFATE 3 ML: .5; 3 SOLUTION RESPIRATORY (INHALATION) at 12:04

## 2017-12-20 RX ADMIN — HYDROMORPHONE HYDROCHLORIDE 1 MG: 1 INJECTION, SOLUTION INTRAMUSCULAR; INTRAVENOUS; SUBCUTANEOUS at 01:32

## 2017-12-20 RX ADMIN — HYDROMORPHONE HYDROCHLORIDE 0.5 MG: 1 INJECTION, SOLUTION INTRAMUSCULAR; INTRAVENOUS; SUBCUTANEOUS at 23:22

## 2017-12-20 RX ADMIN — OXYCODONE HYDROCHLORIDE 30 MG: 15 TABLET ORAL at 23:28

## 2017-12-20 RX ADMIN — HEPARIN SODIUM 5000 UNITS: 5000 INJECTION, SOLUTION INTRAVENOUS; SUBCUTANEOUS at 08:33

## 2017-12-20 RX ADMIN — HYDROMORPHONE HYDROCHLORIDE 0.5 MG: 1 INJECTION, SOLUTION INTRAMUSCULAR; INTRAVENOUS; SUBCUTANEOUS at 12:09

## 2017-12-20 RX ADMIN — IPRATROPIUM BROMIDE AND ALBUTEROL SULFATE 3 ML: .5; 3 SOLUTION RESPIRATORY (INHALATION) at 18:59

## 2017-12-20 RX ADMIN — SODIUM CHLORIDE 50 ML/HR: 4.5 INJECTION, SOLUTION INTRAVENOUS at 11:34

## 2017-12-20 RX ADMIN — HYDROMORPHONE HYDROCHLORIDE 1 MG: 1 INJECTION, SOLUTION INTRAMUSCULAR; INTRAVENOUS; SUBCUTANEOUS at 04:24

## 2017-12-20 RX ADMIN — ALPRAZOLAM 0.5 MG: 0.5 TABLET ORAL at 21:41

## 2017-12-20 RX ADMIN — IPRATROPIUM BROMIDE AND ALBUTEROL SULFATE 3 ML: .5; 3 SOLUTION RESPIRATORY (INHALATION) at 08:38

## 2017-12-20 RX ADMIN — Medication 2 TABLET: at 21:41

## 2017-12-20 RX ADMIN — ALPRAZOLAM 0.5 MG: 0.5 TABLET ORAL at 14:00

## 2017-12-20 RX ADMIN — HYDROMORPHONE HYDROCHLORIDE 0.5 MG: 1 INJECTION, SOLUTION INTRAMUSCULAR; INTRAVENOUS; SUBCUTANEOUS at 19:31

## 2017-12-20 RX ADMIN — OXYCODONE HYDROCHLORIDE 30 MG: 15 TABLET ORAL at 15:36

## 2017-12-20 NOTE — PROGRESS NOTES
Continued Stay Note  [facility]     Patient Name: Lorri Bedoya  MRN: [id number]  Today's Date: 12/20/2017    Admit Date: 12/19/2017          Discharge Plan       12/20/17 1732    Case Management/Social Work Plan    Plan Undetermined    Additional Comments Hospice consult received.  Chart reviewed.  Visited with pt and her grandson.  Pt with intermittent confusion and unable to help with discharge planning.  She says Dee celeste helps her make healthcare decisions and grandson confirms this.  He says Dee is coming to [facility] today, but not until around 1700.  Written information and hospice RN's contact number left with grandson.  Will follow up with Dee celeste tomorrow.  If can be of further assist, call ext [phone].       12/20/17 1528    Case Management/Social Work Plan    Plan Home    Patient/Family In Agreement With Plan yes    Additional Comments Spoke with patient and grandson at bedside regarding discharge planning.  Patient not a good historian but grandson assisted in filling in gaps and confirming or denying information patient giving.  Patient has not used Home Health and has a Straight Cane, Bedside Commode, Shower Chair, and Grab Bars.  Patient has prescription coverage.  Nancy lives with her  and daughter in a multilevel home with 1 step to access also with ramp access.  All patient needs is accessible on one level.  Plan as noted in physican notes is to discharge home with hospice services.  CM following for discharge needs.                Discharge Codes     None        Expected Discharge Date and Time     Expected Discharge Date Expected Discharge Time    Dec 22, 2017             Vannessa Cordon RN

## 2017-12-20 NOTE — PROGRESS NOTES
Continued Stay Note   [facility]     Patient Name: Lorri Bedoya  MRN: [id number]  Today's Date: 12/20/2017    Admit Date: 12/19/2017          Discharge Plan       12/20/17 1528    Case Management/Social Work Plan    Plan Home    Patient/Family In Agreement With Plan yes    Additional Comments Spoke with patient and grandson at bedside regarding discharge planning.  Patient not a good historian but grandson assisted in filling in gaps and confirming or denying information patient giving.  Patient has not used Home Health and has a Straight Cane, Bedside Commode, Shower Chair, and Grab Bars.  Patient has prescription coverage.  Nancy lives with her  and daughter in a multilevel home with 1 step to access also with ramp access.  All patient needs is accessible on one level.  Plan as noted in physican notes is to discharge home with hospice services.  CM following for discharge needs.                Discharge Codes     None        Expected Discharge Date and Time     Expected Discharge Date Expected Discharge Time    Dec 22, 2017             Mckenzie Reyes RN

## 2017-12-20 NOTE — PROGRESS NOTES
"    [facility] Medicine Services  PROGRESS NOTE    Patient Name: Lorri Bedoya  : 1940  MRN: [id number]    Date of Admission: 2017  Length of Stay: 0  Primary Care Physician: Isidoro Zaidi MD    Subjective   Subjective     CC:  Small cell lung cancer with mets, diffuse malignant pain    HPI:  Breathing \"ok\", no wheezing, says constipated. No chest pain. Anxious and wants to go home. Diffuse bone pain persists but better    Review of Systems  No f/c, no headache.     Otherwise ROS is negative except as mentioned in the HPI.    Objective   Objective     Vital Signs:   Temp:  [97.7 °F (36.5 °C)-99.2 °F (37.3 °C)] 98.3 °F (36.8 °C)  Heart Rate:  [79-90] 83  Resp:  [16-20] 16  BP: (131-148)/(70-89) 133/70        Physical Exam:  Elderly, frail, pleasant, no distress  [place]cat, oroph clear  rrr  Faint left > right decreased air movement, no overt wheezes  abd soft, nontender  Trace BLE edema  No extremity rash  Face symmetric, speech clear, equal   Appropriate, slightly anxious affect    Results Reviewed:  I have personally reviewed current lab, radiology, and data and agree.      Results from last 7 days  Lab Units 17  1355 17  1348 12/15/17  1019   WBC 10*3/mm3 6.53 7.80 8.50   HEMOGLOBIN g/dL 13.9 15.5 15.5   HEMATOCRIT % 46.0* 48.8* 48.6*   PLATELETS 10*3/mm3 60* 67* 100*   INR   --  1.10  --        Results from last 7 days  Lab Units 17  2332 17  1355 17  0804 17  1348 12/15/17  1019   SODIUM mmol/L  --  145  --  145 142   POTASSIUM mmol/L 4.5 3.4* 2.7* 2.8* 2.8*   CHLORIDE mmol/L  --  106  --  98* 97*   CO2 mmol/L  --  34.0*  --  34.0* 32.0*   BUN mg/dL  --  11  --  14 19   CREATININE mg/dL  --  0.50*  --  0.60 0.70   GLUCOSE mg/dL  --  136*  --  149* 125*   CALCIUM mg/dL  --  9.7  --  10.4 10.4   ALT (SGPT) U/L  --  15  --   --  14   AST (SGOT) U/L  --  37*  --   --  38*     BNP   Date Value Ref Range Status   2017 779.0 (H) " 0.0 - 100.0 pg/mL Final     No results found for: PHART    Microbiology Results Abnormal     None          Imaging Results (last 24 hours)     Procedure Component Value Units Date/Time    XR Abdomen KUB [[id number]] Collected:  12/19/17 1634     Updated:  12/19/17 1655    Narrative:       EXAMINATION: XR ABDOMEN KUB- 12/19/2017     INDICATION: abdominal pain      COMPARISON: NONE     FINDINGS: There is a nonspecific bowel gas pattern there is no evidence  of mechanical bowel obstruction. There is no hepatosplenomegaly.           Impression:       Negative radiograph of the abdomen.     D:  12/19/2017  E:  12/19/2017     This report was finalized on 12/19/2017 4:53 PM by Dr. Bladimir Kimball MD.                I have reviewed the medications.    Assessment/Plan   Assessment / Plan     Hospital Problem List     * (Principal)Metastatic cancer to spine    Small cell carcinoma of left lung    Generalized weakness    Hypokalemia    Thrombocytopenia    Hypoxia    Tobacco abuse    Encephalopathy    Intractable low back pain    Hypophosphatemia    Lower extremity edema    Metastatic cancer    Malignant bone pain             Brief Hospital Course to date:  Lorri Bedoya is a 77 y.o. female w/ metastatic small cell lung cancer (mets to bones diffusely, left lung and pleura, liver) had plans for palliative chemo and was scheduled for port a cath placement. Labs revaled hypokalemia and thrombocytopenia.  Upon questioning by oncologist, found out patient had been quite weak at home so admitted from home. Discussions regarding goals were initiated by dr. Beltrán (oncologist) and together patient and dr. Beltrán determined that comort measures/palliative care was appropriate. Palliative now following      Assessment & Plan:  -dr. Beltrán' long discussion w/ family noted, have jointly opted for symptomatic/palliative treatment at this point. DNR/comfort measures  -stop iv fluids  -replacing potassium  -echo ordered (per family request,  wish to know for prognostic implications)  -cbc, bmp (plts, renal function) in a.m.  -stool softeners ordered    -palliative following, titrating medications for symptoms control over next 24 hours or so    -patient has declined pt ot evaluation; patient also wishes to go home as soon as possible, possibly tomorrow    DVT Prophylaxis:  teds (stopped sq heparin as pursuing comfort measures and patient does not like the shots)    CODE STATUS: Comfort Measures and Allow Natural Death (A-N-D)    Disposition: I expect the patient to be discharged to be determined, possibly home tomorrow depending on symptoms and clinical course  Pasquale Lopez MD  12/20/17  7:50 AM

## 2017-12-20 NOTE — PLAN OF CARE
Problem: Patient Care Overview (Adult)  Goal: Plan of Care Review  Outcome: Ongoing (interventions implemented as appropriate)      Problem: Pain, Acute (Adult)  Goal: Identify Related Risk Factors and Signs and Symptoms  Outcome: Ongoing (interventions implemented as appropriate)      Problem: Fall Risk (Adult)  Goal: Identify Related Risk Factors and Signs and Symptoms  Outcome: Ongoing (interventions implemented as appropriate)

## 2017-12-20 NOTE — PROGRESS NOTES
Discharge Planning Assessment  [facility]     Patient Name: Lorri Bedoya  MRN: [id number]  Today's Date: 12/20/2017    Admit Date: 12/19/2017          Discharge Needs Assessment       12/20/17 1526    Living Environment    Lives With child(idris), adult    Living Arrangements house    Home Accessibility stairs to enter home    Number of Stairs to Enter Home 1    Number of Stairs Within Home --   1 flight    Type of Financial/Environmental Concern none    Transportation Available car;family or friend will provide    Living Environment    Provides Primary Care For no one    Primary Care Provided By child(idris) (specify)   daughter Dee    Quality Of Family Relationships supportive;helpful;involved    Discharge Needs Assessment    Concerns To Be Addressed no discharge needs identified;denies needs/concerns at this time    Readmission Within The Last 30 Days no previous admission in last 30 days    Anticipated Changes Related to Illness none    Equipment Currently Used at Home cane, straight;commode;shower chair;grab bar    Equipment Needed After Discharge none    Discharge Disposition home or self-care    Discharge Contact Information if Applicable Dee Bedoya, daughter  [phone]  Becky Tatum, daughter  [phone]            Discharge Plan       12/20/17 1528    Case Management/Social Work Plan    Plan Home    Patient/Family In Agreement With Plan yes    Additional Comments Spoke with patient and grandson at bedside regarding discharge planning.  Patient not a good historian but grandson assisted in filling in gaps and confirming or denying information patient giving.  Patient has not used Home Health and has a Straight Cane, Bedside Commode, Shower Chair, and Grab Bars.  Patient has prescription coverage.  Nancy lives with her  and daughter in a multilevel home with 1 step to access also with ramp access.  All patient needs is accessible on one level.  Plan as noted in physican notes is to  discharge home with hospice services.  CM following for discharge needs.          Discharge Placement     No information found        Expected Discharge Date and Time     Expected Discharge Date Expected Discharge Time    Dec 22, 2017               Demographic Summary       12/20/17 1525    Referral Information    Admission Type observation    Arrived From home or self-care    Referral Source admission list    Reason For Consult discharge planning    Record Reviewed clinical discipline documentation;history and physical;patient profile    Primary Care Physician Information    Name Dyan Ye MD            Functional Status       12/20/17 1525    Functional Status Current    Current Functional Level Comment Per Nursing Assessment    Functional Status Prior    Ambulation 1-->assistive equipment    Transferring 1-->assistive equipment    Toileting 1-->assistive equipment    Bathing 1-->assistive equipment    Dressing 0-->independent    Eating 0-->independent    Communication 0-->understands/communicates without difficulty    Swallowing 0-->swallows foods/liquids without difficulty    IADL    Medications independent    Meal Preparation assistive equipment    Housekeeping assistive equipment    Laundry assistive equipment    Shopping assistive equipment    Oral Care independent    Activity Tolerance    Current Activity Limitations none    Usual Activity Tolerance moderate    Current Activity Tolerance moderate    Employment/Financial    Employment/Finance Comments Dee Aureliano, daughter  [phone]  Becky Tatum, daughter  [phone]            Psychosocial     None            Abuse/Neglect     None            Legal     None            Substance Abuse     None            Patient Forms     None          Mckenzie Reyes RN

## 2017-12-20 NOTE — CONSULTS
Isidoro Zaidi MD  Consulting physician: Leigh Ann    No chief complaint on file.        HPI: Patient is a pleasant 77-year-old female for history of lung cancer with bone metastasis.  Patient has known metastasis to her vertebrae.  Patient comes in hospital due to increasing pain primarily in her back area.  She states that the pain is mid back and goes across her back in the mid to lower portion of her back.  When questioned further to patient states that this is more her metastasis is apparently at.  Patient states that the pain has been getting progressively worse which is what brought her to come in a hospital.  Patient does go on to state that she's been having a lot of anxiety as well.  Patient does take Xanax at home usually 3 times a day.      Past Medical History:   Diagnosis Date   • Back pain    • Lung cancer    • Migraine      Past Surgical History:   Procedure Laterality Date   • BRAIN SURGERY      [facility]  Brain bleed caused from a fall   • COLONOSCOPY      4 years ago   • FRACTURE SURGERY      lumbar spine   • LUNG BIOPSY Left 06/2017    [facility]   • LUNG CANCER SURGERY Left 07/2017    [facility]  [facility]     Current Facility-Administered Medications   Medication Dose Route Frequency Provider Last Rate Last Dose   • albuterol (PROVENTIL) nebulizer solution 0.5% 2.5 mg/0.5mL  2.5 mg Nebulization Q6H PRN Jeri Stoner, APRN       • ALPRAZolam (XANAX) tablet 0.5 mg  0.5 mg Oral Q8H Angeloniesha Lin, DO       • heparin (porcine) 5000 UNIT/ML injection 5,000 Units  5,000 Units Subcutaneous Q12H Jeri Stoner APRN   5,000 Units at 12/20/17 0833   • HYDROmorphone (DILAUDID) injection 0.5 mg  0.5 mg Intravenous Q1H PRN Angelo Lin, DO   0.5 mg at 12/20/17 1005   • ipratropium-albuterol (DUO-NEB) nebulizer solution 3 mL  3 mL Nebulization 4x Daily - RT Jeri Stoner APRN   3 mL at 12/20/17 0838   • ketorolac (TORADOL) injection 15 mg  15 mg Intravenous Q8H Angelo D Lin, DO        • Magnesium Sulfate 2 gram Bolus, followed by 8 gram infusion (total Mg dose 10 grams)- Mg less than or equal to 1mg/dL  2 g Intravenous PRN CRAIG Her        Or   • Magnesium Sulfate 6 gram Infusion (2 gm x 3) -Mg 1.1 -1.5 mg/dL  2 g Intravenous PRN JeriCRAIG Ruffin        Or   • magnesium sulfate 4 gram infusion- Mg 1.6-1.9 mg/dL  4 g Intravenous PRN JeriCRAIG Ruffin 25 mL/hr at 12/19/17 1546 4 g at 12/19/17 1546   • nicotine (NICODERM CQ) 21 MG/24HR patch 1 patch  1 patch Transdermal Q24H CRAIG Her   1 patch at 12/20/17 0833   • ondansetron (ZOFRAN) injection 4 mg  4 mg Intravenous Q6H PRN CRAIG Her       • oxyCODONE (ROXICODONE) immediate release tablet 30 mg  30 mg Oral Q4H PRN Emir Warner MD   30 mg at 12/20/17 0833   • oxyCODONE-acetaminophen (PERCOCET) 7.5-325 MG per tablet 1 tablet  1 tablet Oral Q6H PRN Emir Warner MD       • potassium & sodium phosphates (PHOS-NAK) 280-160-250 MG packet - for Phosphorus less than 1.25 mg/dL  2 packet Oral Q6H PRN JeriCRAIG Ruffin        Or   • potassium & sodium phosphates (PHOS-NAK) 280-160-250 MG packet - for Phosphorus 1.25 - 2.1 mg/dL  2 packet Oral Once PRN CRAIG Her       • potassium chloride (MICRO-K) CR capsule 40 mEq  40 mEq Oral PRN JeriCRAIG Ruffin   40 mEq at 12/19/17 2007    Or   • potassium chloride (KLOR-CON) packet 40 mEq  40 mEq Oral PRN CRAIG Her        Or   • potassium chloride 10 mEq in 100 mL IVPB  10 mEq Intravenous Q1H PRN JeriCRAIG Ruffin       • sodium chloride 0.45 % infusion  50 mL/hr Intravenous Continuous Emir Warner MD 50 mL/hr at 12/20/17 1134 50 mL/hr at 12/20/17 1134   • sodium chloride 0.9 % flush 1-10 mL  1-10 mL Intravenous PRN CRAIG Her       • sodium chloride 0.9 % flush 10 mL  10 mL Intravenous PRN Emir Warner MD           sodium chloride 50 mL/hr Last Rate: 50 mL/hr (12/20/17 1134)     •  albuterol  •   "HYDROmorphone  •  magnesium sulfate **OR** magnesium sulfate **OR** magnesium sulfate  •  ondansetron  •  oxyCODONE  •  oxyCODONE-acetaminophen  •  potassium & sodium phosphates **OR** potassium & sodium phosphates  •  potassium chloride **OR** potassium chloride **OR** potassium chloride  •  sodium chloride  •  sodium chloride  No Known Allergies  Family History   Problem Relation Age of Onset   • Lung disease Father    • Lymphoma Sister      Social History     Social History   • Marital status:      Spouse name: N/A   • Number of children: 2   • Years of education: N/A     Occupational History   • Bookeeper      Retired     Social History Main Topics   • Smoking status: Current Every Day Smoker     Packs/day: 1.00     Years: 60.00     Types: Cigarettes   • Smokeless tobacco: Never Used      Comment: As much as 1 1/2 PPD Prev.    • Alcohol use No   • Drug use: No   • Sexual activity: Defer     Other Topics Concern   • Not on file     Social History Narrative   • No narrative on file     Review of Systems - All others reviewed and found negative except as mentioned in the history of present illness      /66 (BP Location: Right arm, Patient Position: Lying)  Pulse 97  Temp 98.1 °F (36.7 °C) (Oral)   Resp 18  Ht 165.1 cm (65\")  Wt 50.8 kg (111 lb 15.9 oz)  SpO2 (!) 88%  BMI 18.64 kg/m2    Intake/Output Summary (Last 24 hours) at 12/20/17 1157  Last data filed at 12/20/17 1134   Gross per 24 hour   Intake             1245 ml   Output              350 ml   Net              895 ml     Physical Exam:      General Appearance:    Alert, cooperative, Mild distress, appearing somewhat anxious, some possible underlying confusion at the patient tells the same story over and over but she is oriented ×3    Head:    Normocephalic, without obvious abnormality, atraumatic   Eyes:            Lids and lashes normal, conjunctivae and sclerae normal, no   icterus, no pallor, corneas clear, PERRLA   Ears:    Ears " appear intact with no abnormalities noted   Throat:   No oral lesions, no thrush, oral mucosa moist   Neck:   No adenopathy, supple, trachea midline, no thyromegaly, no     carotid bruit, no JVD   Back:     No kyphosis present, no scoliosis present, no skin lesions,       erythema or scars, no tenderness to percussion or                   palpation,   range of motion normal   Lungs:     Clear to auscultation,respirations regular, even and                   unlabored    Heart:    Regular rhythm and normal rate, normal S1 and S2, no            murmur, no gallop, no rub, no click   Breast Exam:    Deferred   Abdomen:     Normal bowel sounds, no masses, no organomegaly, soft        non-tender, non-distended, no guarding, no rebound                 tenderness   Genitalia:    Deferred   Extremities:   Moves all extremities well, no edema, no cyanosis, no              redness   Pulses:   Pulses palpable and equal bilaterally   Skin:   No bleeding, bruising or rash   Lymph nodes:   No palpable adenopathy   Neurologic:   Cranial nerves 2 - 12 grossly intact, sensation intact, DTR        present and equal bilaterally         Results from last 7 days  Lab Units 12/20/17  0753   WBC 10*3/mm3 5.83   HEMOGLOBIN g/dL 12.7   HEMATOCRIT % 40.6   PLATELETS 10*3/mm3 59*       Results from last 7 days  Lab Units 12/20/17  0753  12/19/17  1355   SODIUM mmol/L 139  --  145   POTASSIUM mmol/L 4.7  < > 3.4*   CHLORIDE mmol/L 105  --  106   CO2 mmol/L 29.0  --  34.0*   BUN mg/dL 9  --  11   CREATININE mg/dL 0.50*  --  0.50*   CALCIUM mg/dL 9.2  --  9.7   BILIRUBIN mg/dL  --   --  0.9   ALK PHOS U/L  --   --  144*   ALT (SGPT) U/L  --   --  15   AST (SGOT) U/L  --   --  37*   GLUCOSE mg/dL 154*  --  136*   < > = values in this interval not displayed.    Results from last 7 days  Lab Units 12/20/17  0753   SODIUM mmol/L 139   POTASSIUM mmol/L 4.7   CHLORIDE mmol/L 105   CO2 mmol/L 29.0   BUN mg/dL 9   CREATININE mg/dL 0.50*   GLUCOSE mg/dL  154*   CALCIUM mg/dL 9.2     Imaging Results (last 72 hours)     Procedure Component Value Units Date/Time    XR Abdomen KUB [[id number]] Collected:  12/19/17 1634     Updated:  12/19/17 1655    Narrative:       EXAMINATION: XR ABDOMEN KUB- 12/19/2017     INDICATION: abdominal pain      COMPARISON: NONE     FINDINGS: There is a nonspecific bowel gas pattern there is no evidence  of mechanical bowel obstruction. There is no hepatosplenomegaly.           Impression:       Negative radiograph of the abdomen.     D:  12/19/2017  E:  12/19/2017     This report was finalized on 12/19/2017 4:53 PM by Dr. Bladimir Kimball MD.           Impression: Lung cancer with bone metastasis  Neoplastic pain  Anxiety  Goals of care  Plan: At this point time in respect to the patient's symptoms I will actually decrease the patient's opioids slightly as these could be contributed to her possible underlying confusion.  I will have a low threshold for increasing the patient's opioids.  I will go ahead and place the patient on scheduled Toradol to see if this will help her pain.  Some consideration to starting patient on steroids could be given, however the patient is somewhat restless and anxious and steroids managed to make this worse.  With this mind I will also increase the patient's Xanax as well as making it scheduled as opposed to as needed.  We will see how patient does over the next 24 hours and make adjustments from there.        Angelo Lin,   12/20/17  11:57 AM

## 2017-12-20 NOTE — SIGNIFICANT NOTE
Palliative Team Meeting Attendance  13:00             DO COOKIE Avina, RN, CHPN              KARLA Pretty, JOSE DANIELW, [facility]  eLo Paredes MDiv  TANI Means RN, CHPN

## 2017-12-20 NOTE — CONSULTS
HEMATOLOGY/ONCOLOGY INPATIENT CONSULT    REASON FOR CONSULT: Refractory pain and failure to thrive due to metastatic small cell lung cancer    HISTORY OF PRESENT ILLNESS; I am asked to see this 77 y.o.  female, referred for refractory pain and failure to thrive due to the static small cell lung cancer to the bone.  She was initially diagnosed at thoracotomy with left lower wedge resection July 2017 with small cell lung cancer.  No subsequent chemotherapy was given.  She was found to have metastatic disease to the spine and underwent kyphoplasty to L3 11/1/17 and [facility].  She then received radiation of T12 through L2 for pain control and then her family moved her to the [facility] for further care.  Repeat PET scan 12/13/17 showed widespread osseous metastases too numerous to count with a liver lesion and left frontal calvarial lesion as well.  MRI of the brain 12/13/17 showed white matter changes.  She was scheduled earlier this week to get a port placed but due to hypokalemia that procedure was canceled.  I spoke with the daughter earlier in the day and she will had rather progressive failure to thrive with increasing lower extremity edema worsening in the last week.  Upon evaluation today her BNP was over 700 and her platelets were down to 60,000.  I then saw her at that point to discuss with her and her 2 daughters regarding this predicament.      Past Medical History:   Diagnosis Date   • Back pain    • Lung cancer    • Migraine      Past Surgical History:   Procedure Laterality Date   • BRAIN SURGERY      [facility] [place]  Brain bleed caused from a fall   • COLONOSCOPY      4 years ago   • FRACTURE SURGERY      lumbar spine   • LUNG BIOPSY Left 06/2017    [facility]   • LUNG CANCER SURGERY Left 07/2017    [facility]  [facility]       No current facility-administered medications on file prior to encounter.      Current Outpatient Prescriptions on File Prior to Encounter   Medication Sig Dispense  Refill   • ALPRAZolam (XANAX) 0.5 MG tablet Take 0.5 mg by mouth 3 (Three) Times a Day As Needed for Anxiety.     • oxyCODONE (ROXICODONE) 30 MG immediate release tablet Take 1-2 by mouth every 4-6 hours as needed for pain (Patient taking differently: Take 30 mg by mouth Every 4 (Four) Hours As Needed for Moderate Pain . Take 1-2 by mouth every 4-6 hours as needed for pain) 125 tablet 0   • oxyCODONE-acetaminophen (PERCOCET)  MG per tablet Take 1 tablet by mouth Every 6 (Six) Hours As Needed for Moderate Pain .     • butalbital-acetaminophen-caffeine (FIORICET, ESGIC) -40 MG per tablet Take 1 tablet by mouth As Needed for Headache or Migraine.     • DIPHENHYDRAMINE HCL, SLEEP, PO Take 50 mg by mouth Every Night. 50 Mg as needed      • gabapentin (NEURONTIN) 600 MG tablet Take 1,200 mg by mouth 3 (Three) Times a Day.     • ibuprofen (ADVIL,MOTRIN) 800 MG tablet Take 800 mg by mouth Every 6 (Six) Hours As Needed for Moderate Pain .  1   • lidocaine-prilocaine (EMLA) 2.5-2.5 % cream Apply  topically As Needed.     • Morphine (MS CONTIN) 15 MG 12 hr tablet Take 1 tablet by mouth 2 (Two) Times a Day for 30 days. 1 tablet Q12H 60 tablet 0   • rizatriptan (MAXALT) 10 MG tablet Take 10 mg by mouth 1 (One) Time As Needed for Migraine. May repeat in 2 hours if needed         No Known Allergies    Social History     Social History   • Marital status:      Spouse name: N/A   • Number of children: 2   • Years of education: N/A     Occupational History   • Bookeeper      Retired     Social History Main Topics   • Smoking status: Current Every Day Smoker     Packs/day: 1.00     Years: 60.00     Types: Cigarettes   • Smokeless tobacco: Never Used      Comment: As much as 1 1/2 PPD Prev.    • Alcohol use No   • Drug use: No   • Sexual activity: Defer     Other Topics Concern   • None     Social History Narrative   • None       Family History   Problem Relation Age of Onset   • Lung disease Father    • Lymphoma  "Sister          REVIEW OF SYSTEMS:  A 14 point review of systems was performed and is negative except as noted above.      PHYSICAL EXAM:    /70 (BP Location: Right arm, Patient Position: Lying)  Pulse 87  Temp 99.1 °F (37.3 °C) (Oral)   Resp 20  Ht 165.1 cm (65\")  Wt 50.8 kg (111 lb 15.9 oz)  SpO2 (!) 89%  BMI 18.64 kg/m2    ECOG: (3) Capable of limited self-care, confined to bed or chair > 50% of waking hours  General: well appearing female in no acute distress  HEENT: sclera anicteric, oropharynx clear  Lymphatics: no cervical, supraclavicular, inguinal, or axillary adenopathy  Neck: Supple. No thyromegaly.  Cardiovascular: regular rate and rhythm, no murmurs  Lungs: clear to auscultation bilaterally. No respiratory distress  Abdomen: soft, nontender, nondistended.  No palpable organomegaly  Extremities: no lower extremity , cyanosis, or clubbing but with 2+ pretibial edema  Skin: no rashes, lesions, bruising, or petechiae  Neuro: Alert and oriented x3. Moves all extremities.        Results from last 7 days  Lab Units 12/19/17  1355 12/17/17  1348 12/15/17  1019   WBC 10*3/mm3 6.53 7.80 8.50   HEMOGLOBIN g/dL 13.9 15.5 15.5   PLATELETS 10*3/mm3 60* 67* 100*       Results from last 7 days  Lab Units 12/19/17  1355 12/18/17  0804 12/17/17  1348 12/15/17  1019   SODIUM mmol/L 145  --  145 142   POTASSIUM mmol/L 3.4* 2.7* 2.8* 2.8*   CO2 mmol/L 34.0*  --  34.0* 32.0*   BUN mg/dL 11  --  14 19   CREATININE mg/dL 0.50*  --  0.60 0.70   MAGNESIUM mg/dL 1.8  --   --  1.9   PHOSPHORUS mg/dL 2.8  --   --  2.3*   GLUCOSE mg/dL 136*  --  149* 125*   AST (SGOT) U/L 37*  --   --  38*   ALT (SGPT) U/L 15  --   --  14   BILIRUBIN mg/dL 0.9  --   --  0.9   ALK PHOS U/L 144*  --   --  174*     Estimated Creatinine Clearance: 45.7 mL/min (by C-G formula based on Cr of 0.5).      Imaging Results (last 7 days)     Procedure Component Value Units Date/Time    XR Abdomen KUB [[id number]] Collected:  12/19/17 1634     " Updated:  12/19/17 1655    Narrative:       EXAMINATION: XR ABDOMEN KUB- 12/19/2017     INDICATION: abdominal pain      COMPARISON: NONE     FINDINGS: There is a nonspecific bowel gas pattern there is no evidence  of mechanical bowel obstruction. There is no hepatosplenomegaly.           Impression:       Negative radiograph of the abdomen.     D:  12/19/2017  E:  12/19/2017     This report was finalized on 12/19/2017 4:53 PM by Dr. Bladimir Kimball MD.                ASSESSMENT & PLAN:    1. Small cell lung cancer  2. Bone metastases too numerous to count  3. Liver metastasis  4. Extremity edema  5. Elevated BNP  6. Normal creatinine  7. Thrombocytopenia    Discussion: after 1 hour and 30 minute discussion with daughter and the patient, given the multiple Catch-22 use between giving platinum agents in the face of elevated BNP but needing to give hydration, myelosuppression in the face of thrombocytopenia, and all of this in the face of the disease that has marched forward quickly over the last 6 months without treatment systemically in that interval but now likely too frail to withstand any meaningful systemic therapies, the daughters and the patient and I have come to the mutual conclusion that she should be a DNR with palliative care to be consult it and hospice upon discharge.  I will see her back in my office in 9 days and if she rallies then we can always stop hospice and readdress the possibility of systemic chemotherapy but that would be unlikely given her overall debilitation and our goal is palliation at best and clearly within the next 2 weeks I'm likely to do more harm than good with her poor performance status and now possibly congestive failure on top of all of that.  Dillan Beltrán MD    12/19/2017

## 2017-12-21 ENCOUNTER — APPOINTMENT (OUTPATIENT)
Dept: ONCOLOGY | Facility: HOSPITAL | Age: 77
End: 2017-12-21

## 2017-12-21 ENCOUNTER — APPOINTMENT (OUTPATIENT)
Dept: CARDIOLOGY | Facility: HOSPITAL | Age: 77
End: 2017-12-21
Attending: INTERNAL MEDICINE

## 2017-12-21 LAB
ANION GAP SERPL CALCULATED.3IONS-SCNC: 8 MMOL/L (ref 3–11)
BH CV ECHO MEAS - AO MAX PG (FULL): 5.5 MMHG
BH CV ECHO MEAS - AO MAX PG: 10 MMHG
BH CV ECHO MEAS - AO MEAN PG (FULL): 3.4 MMHG
BH CV ECHO MEAS - AO MEAN PG: 5.5 MMHG
BH CV ECHO MEAS - AO ROOT AREA (BSA CORRECTED): 2.1
BH CV ECHO MEAS - AO ROOT AREA: 8.1 CM^2
BH CV ECHO MEAS - AO ROOT DIAM: 3.2 CM
BH CV ECHO MEAS - AO V2 MAX: 157 CM/SEC
BH CV ECHO MEAS - AO V2 MEAN: 111 CM/SEC
BH CV ECHO MEAS - AO V2 VTI: 36 CM
BH CV ECHO MEAS - ASC AORTA: 3.2 CM
BH CV ECHO MEAS - AVA(I,A): 2.5 CM^2
BH CV ECHO MEAS - AVA(I,D): 2.5 CM^2
BH CV ECHO MEAS - AVA(V,A): 2.1 CM^2
BH CV ECHO MEAS - AVA(V,D): 2.1 CM^2
BH CV ECHO MEAS - BSA(HAYCOCK): 1.5 M^2
BH CV ECHO MEAS - BSA: 1.5 M^2
BH CV ECHO MEAS - BZI_BMI: 18.5 KILOGRAMS/M^2
BH CV ECHO MEAS - BZI_METRIC_HEIGHT: 165.1 CM
BH CV ECHO MEAS - BZI_METRIC_WEIGHT: 50.3 KG
BH CV ECHO MEAS - EDV(CUBED): 88.4 ML
BH CV ECHO MEAS - EDV(TEICH): 90.3 ML
BH CV ECHO MEAS - EF(CUBED): 64.8 %
BH CV ECHO MEAS - EF(TEICH): 56.5 %
BH CV ECHO MEAS - ESV(CUBED): 31.1 ML
BH CV ECHO MEAS - ESV(TEICH): 39.3 ML
BH CV ECHO MEAS - FS: 29.4 %
BH CV ECHO MEAS - IVS/LVPW: 1.1
BH CV ECHO MEAS - IVSD: 0.97 CM
BH CV ECHO MEAS - LA DIMENSION: 3.8 CM
BH CV ECHO MEAS - LA/AO: 1.2
BH CV ECHO MEAS - LV MASS(C)D: 133.6 GRAMS
BH CV ECHO MEAS - LV MASS(C)DI: 86.7 GRAMS/M^2
BH CV ECHO MEAS - LV MAX PG: 4.5 MMHG
BH CV ECHO MEAS - LV MEAN PG: 2.1 MMHG
BH CV ECHO MEAS - LV V1 MAX: 106.1 CM/SEC
BH CV ECHO MEAS - LV V1 MEAN: 66.4 CM/SEC
BH CV ECHO MEAS - LV V1 VTI: 29.7 CM
BH CV ECHO MEAS - LVIDD: 4.5 CM
BH CV ECHO MEAS - LVIDS: 3.1 CM
BH CV ECHO MEAS - LVOT AREA (M): 3.1 CM^2
BH CV ECHO MEAS - LVOT AREA: 3 CM^2
BH CV ECHO MEAS - LVOT DIAM: 2 CM
BH CV ECHO MEAS - LVPWD: 0.86 CM
BH CV ECHO MEAS - MV A MAX VEL: 179.7 CM/SEC
BH CV ECHO MEAS - MV DEC TIME: 0.1 SEC
BH CV ECHO MEAS - MV E MAX VEL: 112.6 CM/SEC
BH CV ECHO MEAS - MV E/A: 0.63
BH CV ECHO MEAS - PA ACC SLOPE: 575.7 CM/SEC^2
BH CV ECHO MEAS - PA ACC TIME: 0.11 SEC
BH CV ECHO MEAS - PA MAX PG: 5 MMHG
BH CV ECHO MEAS - PA PR(ACCEL): 29.1 MMHG
BH CV ECHO MEAS - PA V2 MAX: 111.6 CM/SEC
BH CV ECHO MEAS - RAP SYSTOLE: 8 MMHG
BH CV ECHO MEAS - RVDD: 2.3 CM
BH CV ECHO MEAS - RVSP: 56 MMHG
BH CV ECHO MEAS - SI(AO): 188 ML/M^2
BH CV ECHO MEAS - SI(CUBED): 37.2 ML/M^2
BH CV ECHO MEAS - SI(LVOT): 58.7 ML/M^2
BH CV ECHO MEAS - SI(TEICH): 33.1 ML/M^2
BH CV ECHO MEAS - SV(AO): 289.5 ML
BH CV ECHO MEAS - SV(CUBED): 57.3 ML
BH CV ECHO MEAS - SV(LVOT): 90.4 ML
BH CV ECHO MEAS - SV(TEICH): 51 ML
BH CV ECHO MEAS - TAPSE (>1.6): 2.4 CM2
BH CV ECHO MEAS - TR MAX VEL: 340.5 CM/SEC
BH CV ECHO MEAS - TV MAX PG: 2.1 MMHG
BH CV ECHO MEAS - TV V2 MAX: 72.6 CM/SEC
BH CV XLRA - RV BASE: 3.7 CM
BH CV XLRA - RV LENGTH: 7.1 CM
BH CV XLRA - RV MID: 3.2 CM
BH CV XLRA - TDI S': 15 CM/SEC
BUN BLD-MCNC: 8 MG/DL (ref 9–23)
BUN/CREAT SERPL: 20 (ref 7–25)
CALCIUM SPEC-SCNC: 9.4 MG/DL (ref 8.7–10.4)
CHLORIDE SERPL-SCNC: 96 MMOL/L (ref 99–109)
CO2 SERPL-SCNC: 30 MMOL/L (ref 20–31)
CREAT BLD-MCNC: 0.4 MG/DL (ref 0.6–1.3)
DEPRECATED RDW RBC AUTO: 72.6 FL (ref 37–54)
ERYTHROCYTE [DISTWIDTH] IN BLOOD BY AUTOMATED COUNT: 19.5 % (ref 11.3–14.5)
GFR SERPL CREATININE-BSD FRML MDRD: >150 ML/MIN/1.73
GLUCOSE BLD-MCNC: 116 MG/DL (ref 70–100)
HCT VFR BLD AUTO: 42.3 % (ref 34.5–44)
HGB BLD-MCNC: 12.9 G/DL (ref 11.5–15.5)
MCH RBC QN AUTO: 31.3 PG (ref 27–31)
MCHC RBC AUTO-ENTMCNC: 30.5 G/DL (ref 32–36)
MCV RBC AUTO: 102.7 FL (ref 80–99)
PLATELET # BLD AUTO: 57 10*3/MM3 (ref 150–450)
PMV BLD AUTO: 12 FL (ref 6–12)
POTASSIUM BLD-SCNC: 3.7 MMOL/L (ref 3.5–5.5)
RBC # BLD AUTO: 4.12 10*6/MM3 (ref 3.89–5.14)
SODIUM BLD-SCNC: 134 MMOL/L (ref 132–146)
WBC NRBC COR # BLD: 5.5 10*3/MM3 (ref 3.5–10.8)

## 2017-12-21 PROCEDURE — 94640 AIRWAY INHALATION TREATMENT: CPT

## 2017-12-21 PROCEDURE — 93306 TTE W/DOPPLER COMPLETE: CPT

## 2017-12-21 PROCEDURE — 94760 N-INVAS EAR/PLS OXIMETRY 1: CPT

## 2017-12-21 PROCEDURE — 85027 COMPLETE CBC AUTOMATED: CPT | Performed by: INTERNAL MEDICINE

## 2017-12-21 PROCEDURE — 94799 UNLISTED PULMONARY SVC/PX: CPT

## 2017-12-21 PROCEDURE — 80048 BASIC METABOLIC PNL TOTAL CA: CPT | Performed by: INTERNAL MEDICINE

## 2017-12-21 PROCEDURE — 25010000002 KETOROLAC TROMETHAMINE PER 15 MG: Performed by: FAMILY MEDICINE

## 2017-12-21 PROCEDURE — 25010000002 HYDROMORPHONE PER 4 MG: Performed by: FAMILY MEDICINE

## 2017-12-21 PROCEDURE — 93306 TTE W/DOPPLER COMPLETE: CPT | Performed by: INTERNAL MEDICINE

## 2017-12-21 PROCEDURE — 25010000002 ONDANSETRON PER 1 MG: Performed by: NURSE PRACTITIONER

## 2017-12-21 PROCEDURE — 99231 SBSQ HOSP IP/OBS SF/LOW 25: CPT | Performed by: INTERNAL MEDICINE

## 2017-12-21 RX ADMIN — HYDROMORPHONE HYDROCHLORIDE 0.5 MG: 1 INJECTION, SOLUTION INTRAMUSCULAR; INTRAVENOUS; SUBCUTANEOUS at 20:43

## 2017-12-21 RX ADMIN — KETOROLAC TROMETHAMINE 15 MG: 15 INJECTION, SOLUTION INTRAMUSCULAR; INTRAVENOUS at 06:08

## 2017-12-21 RX ADMIN — ALPRAZOLAM 0.5 MG: 0.5 TABLET ORAL at 20:43

## 2017-12-21 RX ADMIN — OXYCODONE HYDROCHLORIDE 30 MG: 15 TABLET ORAL at 03:24

## 2017-12-21 RX ADMIN — OXYCODONE HYDROCHLORIDE 30 MG: 15 TABLET ORAL at 09:20

## 2017-12-21 RX ADMIN — HYDROMORPHONE HYDROCHLORIDE 0.5 MG: 1 INJECTION, SOLUTION INTRAMUSCULAR; INTRAVENOUS; SUBCUTANEOUS at 03:24

## 2017-12-21 RX ADMIN — OXYCODONE HYDROCHLORIDE 30 MG: 15 TABLET ORAL at 14:47

## 2017-12-21 RX ADMIN — Medication 2 TABLET: at 09:20

## 2017-12-21 RX ADMIN — IPRATROPIUM BROMIDE AND ALBUTEROL SULFATE 3 ML: .5; 3 SOLUTION RESPIRATORY (INHALATION) at 20:04

## 2017-12-21 RX ADMIN — KETOROLAC TROMETHAMINE 15 MG: 15 INJECTION, SOLUTION INTRAMUSCULAR; INTRAVENOUS at 23:24

## 2017-12-21 RX ADMIN — HYDROMORPHONE HYDROCHLORIDE 0.5 MG: 1 INJECTION, SOLUTION INTRAMUSCULAR; INTRAVENOUS; SUBCUTANEOUS at 11:26

## 2017-12-21 RX ADMIN — ALPRAZOLAM 0.5 MG: 0.5 TABLET ORAL at 03:28

## 2017-12-21 RX ADMIN — KETOROLAC TROMETHAMINE 15 MG: 15 INJECTION, SOLUTION INTRAMUSCULAR; INTRAVENOUS at 14:48

## 2017-12-21 RX ADMIN — HYDROMORPHONE HYDROCHLORIDE 0.5 MG: 1 INJECTION, SOLUTION INTRAMUSCULAR; INTRAVENOUS; SUBCUTANEOUS at 18:32

## 2017-12-21 RX ADMIN — ONDANSETRON 4 MG: 2 INJECTION INTRAMUSCULAR; INTRAVENOUS at 15:50

## 2017-12-21 RX ADMIN — ALPRAZOLAM 0.5 MG: 0.5 TABLET ORAL at 11:26

## 2017-12-21 RX ADMIN — OXYCODONE HYDROCHLORIDE AND ACETAMINOPHEN 1 TABLET: 7.5; 325 TABLET ORAL at 13:07

## 2017-12-21 RX ADMIN — Medication 2 TABLET: at 20:43

## 2017-12-21 RX ADMIN — NICOTINE 1 PATCH: 21 PATCH, EXTENDED RELEASE TRANSDERMAL at 09:19

## 2017-12-21 RX ADMIN — HYDROMORPHONE HYDROCHLORIDE 0.5 MG: 1 INJECTION, SOLUTION INTRAMUSCULAR; INTRAVENOUS; SUBCUTANEOUS at 10:06

## 2017-12-21 RX ADMIN — IPRATROPIUM BROMIDE AND ALBUTEROL SULFATE 3 ML: .5; 3 SOLUTION RESPIRATORY (INHALATION) at 08:18

## 2017-12-21 RX ADMIN — IPRATROPIUM BROMIDE AND ALBUTEROL SULFATE 3 ML: .5; 3 SOLUTION RESPIRATORY (INHALATION) at 16:58

## 2017-12-21 NOTE — PLAN OF CARE
Problem: Pain, Acute (Adult)  Goal: Acceptable Pain Control/Comfort Level  Outcome: Ongoing (interventions implemented as appropriate)      Problem: Fall Risk (Adult)  Goal: Absence of Falls  Outcome: Ongoing (interventions implemented as appropriate)      Problem: Pressure Ulcer Risk (Timbo Q Scale) (Pediatric)  Goal: Skin Integrity  Outcome: Ongoing (interventions implemented as appropriate)

## 2017-12-21 NOTE — PROGRESS NOTES
Continued Stay Note   [place]     Patient Name: Lorri Bedoya  MRN: [id number]  Today's Date: 12/21/2017    Admit Date: 12/19/2017          Discharge Plan       12/21/17 1649    Case Management/Social Work Plan    Plan Home with [facility]    Additional Comments Chart reviewed.  Spoke with dtr Dee about discharge plans.  Medical records faxed to [facility] ([phone]) as well as scripts.  Short fills for Xanax and oxycodone will be filled by [facility].  Hard copy scripts to go with pt on discharge.  Dtr will get equipment delivered from [facility] and bring portable oxygen to transport patient home on.  24 hour number provided to dtr, Dee.  Spoke with nurse, Kendra and gave her update on plan.  Dr. Lin had placed short and long fill scripts of Xanax and oxycodone on chart.  Kendra shredded long term scripts per [facility] request as they only need short fill.  Hospice liaison will not be here tomorrow 12/22 on day of discharge.  If any further needs, call [facility] case management.                Discharge Codes     None        Expected Discharge Date and Time     Expected Discharge Date Expected Discharge Time    Dec 22, 2017             Vannessa Cordon RN

## 2017-12-21 NOTE — PROGRESS NOTES
Palliative Care Progress Note    Date of Admission: 12/19/2017    Subjective:  Patient seems to state that her pain is somewhat better controlled today, however during the conversation is hard to get a very good subjective data for the patient as she is somewhat confused.  No current facility-administered medications on file prior to encounter.      Current Outpatient Prescriptions on File Prior to Encounter   Medication Sig Dispense Refill   • ALPRAZolam (XANAX) 0.5 MG tablet Take 0.5 mg by mouth 3 (Three) Times a Day As Needed for Anxiety.     • oxyCODONE (ROXICODONE) 30 MG immediate release tablet Take 1-2 by mouth every 4-6 hours as needed for pain (Patient taking differently: Take 30 mg by mouth Every 4 (Four) Hours As Needed for Moderate Pain . Take 1-2 by mouth every 4-6 hours as needed for pain) 125 tablet 0   • oxyCODONE-acetaminophen (PERCOCET)  MG per tablet Take 1 tablet by mouth Every 6 (Six) Hours As Needed for Moderate Pain .     • butalbital-acetaminophen-caffeine (FIORICET, ESGIC) -40 MG per tablet Take 1 tablet by mouth As Needed for Headache or Migraine.     • DIPHENHYDRAMINE HCL, SLEEP, PO Take 50 mg by mouth Every Night. 50 Mg as needed      • gabapentin (NEURONTIN) 600 MG tablet Take 1,200 mg by mouth 3 (Three) Times a Day.     • ibuprofen (ADVIL,MOTRIN) 800 MG tablet Take 800 mg by mouth Every 6 (Six) Hours As Needed for Moderate Pain .  1   • lidocaine-prilocaine (EMLA) 2.5-2.5 % cream Apply  topically As Needed.     • Morphine (MS CONTIN) 15 MG 12 hr tablet Take 1 tablet by mouth 2 (Two) Times a Day for 30 days. 1 tablet Q12H 60 tablet 0   • rizatriptan (MAXALT) 10 MG tablet Take 10 mg by mouth 1 (One) Time As Needed for Migraine. May repeat in 2 hours if needed          •  albuterol  •  bisacodyl  •  HYDROmorphone  •  magnesium sulfate **OR** magnesium sulfate **OR** magnesium sulfate  •  ondansetron  •  oxyCODONE  •  oxyCODONE-acetaminophen  •  polyethylene glycol  •  potassium  "& sodium phosphates **OR** potassium & sodium phosphates  •  potassium chloride **OR** potassium chloride **OR** potassium chloride  •  sodium chloride  •  sodium chloride    Objective: /80 (BP Location: Left arm, Patient Position: Lying)  Pulse 93  Temp 97.9 °F (36.6 °C) (Axillary)   Resp 16  Ht 165.1 cm (65\")  Wt 50.8 kg (111 lb 15.9 oz)  SpO2 91%  BMI 18.64 kg/m2     Intake/Output Summary (Last 24 hours) at 12/21/17 1310  Last data filed at 12/21/17 0826   Gross per 24 hour   Intake              240 ml   Output              575 ml   Net             -335 ml     Physical Exam:      General Appearance:    Awake, confused   Head:    Normocephalic, without obvious abnormality, atraumatic   Eyes:            Lids and lashes normal, conjunctivae and sclerae normal, no   icterus, no pallor, corneas clear, PERRLA   Ears:    Ears appear intact with no abnormalities noted   Throat:   No oral lesions, no thrush, oral mucosa moist   Neck:   No adenopathy, supple, trachea midline, no thyromegaly, no     carotid bruit, no JVD   Back:     No kyphosis present, no scoliosis present, no skin lesions,       erythema or scars, no tenderness to percussion or                   palpation,   range of motion normal   Lungs:     Clear to auscultation,respirations regular, even and                   unlabored    Heart:    Regular rhythm and normal rate, normal S1 and S2, no            murmur, no gallop, no rub, no click   Breast Exam:    Deferred   Abdomen:     Normal bowel sounds, no masses, no organomegaly, soft        non-tender, non-distended, no guarding, no rebound                 tenderness   Genitalia:    Deferred   Extremities:   Moves all extremities well, no edema, no cyanosis, no              redness   Pulses:   Pulses palpable and equal bilaterally   Skin:   No bleeding, bruising or rash   Lymph nodes:   No palpable adenopathy   Neurologic:   Cranial nerves 2 - 12 grossly intact, sensation intact, DTR        present " and equal bilaterally       Results from last 7 days  Lab Units 12/21/17  0458   WBC 10*3/mm3 5.50   HEMOGLOBIN g/dL 12.9   HEMATOCRIT % 42.3   PLATELETS 10*3/mm3 57*       Results from last 7 days  Lab Units 12/21/17  0458  12/19/17  1355   SODIUM mmol/L 134  < > 145   POTASSIUM mmol/L 3.7  < > 3.4*   CHLORIDE mmol/L 96*  < > 106   CO2 mmol/L 30.0  < > 34.0*   BUN mg/dL 8*  < > 11   CREATININE mg/dL 0.40*  < > 0.50*   CALCIUM mg/dL 9.4  < > 9.7   BILIRUBIN mg/dL  --   --  0.9   ALK PHOS U/L  --   --  144*   ALT (SGPT) U/L  --   --  15   AST (SGOT) U/L  --   --  37*   GLUCOSE mg/dL 116*  < > 136*   < > = values in this interval not displayed.    Impression: Lung cancer with bone metastasis  Neoplastic pain  Anxiety  Goals of care  Plan: Talked with patient about symptomes and she feels they are controlled so will continue current pain regimen.  Patient is ok to go home with hospice from my standpoint.      Angelo Lin,   12/21/17  1:10 PM

## 2017-12-21 NOTE — PROGRESS NOTES
"    [facility]  PROGRESS NOTE    Patient Name: Lorri Bedoya  : 1940  MRN: [id number]    Date of Admission: 2017  Length of Stay: 1  Primary Care Physician: Isidoro Zaidi MD    Subjective   Subjective     CC:  Small cell lung cancer with mets, diffuse malignant pain    HPI:  Mildly drowsy, confused. No distress. Denies pain currently. \"i just want to go home\"    Review of Systems  No f/c, no headache.     Otherwise ROS is negative except as mentioned in the HPI.    Objective   Objective     Vital Signs:   Temp:  [97.3 °F (36.3 °C)-98 °F (36.7 °C)] 97.3 °F (36.3 °C)  Heart Rate:  [85-97] 95  Resp:  [16-18] 18  BP: (128-159)/(75-93) 128/75        Physical Exam:  Elderly, frail, pleasant, mildly drowsy but easily arousable.  [place], oroph clear  rrr  Faint left > right decreased air movement, no overt wheezes  abd soft, nontender  Trace BLE edema  No extremity rash  Face symmetric, speech clear, equal   Appropriate, slightly anxious affect    Results Reviewed:  I have personally reviewed current lab, radiology, and data and agree.      Results from last 7 days  Lab Units 17  0458 17  0753 17  1355 17  1348   WBC 10*3/mm3 5.50 5.83 6.53 7.80   HEMOGLOBIN g/dL 12.9 12.7 13.9 15.5   HEMATOCRIT % 42.3 40.6 46.0* 48.8*   PLATELETS 10*3/mm3 57* 59* 60* 67*   INR   --   --   --  1.10       Results from last 7 days  Lab Units 17  0458 17  0753 17  2332 17  1355  12/15/17  1019   SODIUM mmol/L 134 139  --  145  < > 142   POTASSIUM mmol/L 3.7 4.7 4.5 3.4*  < > 2.8*   CHLORIDE mmol/L 96* 105  --  106  < > 97*   CO2 mmol/L 30.0 29.0  --  34.0*  < > 32.0*   BUN mg/dL 8* 9  --  11  < > 19   CREATININE mg/dL 0.40* 0.50*  --  0.50*  < > 0.70   GLUCOSE mg/dL 116* 154*  --  136*  < > 125*   CALCIUM mg/dL 9.4 9.2  --  9.7  < > 10.4   ALT (SGPT) U/L  --   --   --  15  --  14   AST (SGOT) U/L  --   --   --  37*  --  38* "   < > = values in this interval not displayed.  BNP   Date Value Ref Range Status   12/19/2017 779.0 (H) 0.0 - 100.0 pg/mL Final     No results found for: PHART    Microbiology Results Abnormal     None          Imaging Results (last 24 hours)     ** No results found for the last 24 hours. **        Results for orders placed during the hospital encounter of 12/19/17   Adult Transthoracic Echo Complete W/ Cont if Necessary Per Protocol    Narrative · Left ventricular systolic function is normal.  · Estimated EF appears to be in the range of 61 - 65%.  · Left ventricular diastolic dysfunction (grade I) consistent with   impaired relaxation.  · Mild mitral valve regurgitation is present  · Right ventricular cavity is mildly dilated.  · Mild to moderate tricuspid valve regurgitation is present.  · Calculated right ventricular systolic pressure from tricuspid   regurgitation is 56 mmHg.  · There is a right pleural effusion.  · There is a trivial pericardial effusion          I have reviewed the medications.    Assessment/Plan   Assessment / Plan     Hospital Problem List     * (Principal)Metastatic cancer to spine    Small cell carcinoma of left lung    Generalized weakness    Hypokalemia    Thrombocytopenia    Hypoxia    Tobacco abuse    Encephalopathy    Intractable low back pain    Hypophosphatemia    Lower extremity edema    Metastatic cancer    Malignant bone pain             Brief Hospital Course to date:  Lorri Bedoya is a 77 y.o. female w/ metastatic small cell lung cancer (mets to bones diffusely, left lung and pleura, liver) had plans for palliative chemo and was scheduled for port a cath placement. Labs revaled hypokalemia and thrombocytopenia.  Upon questioning by oncologist, found out patient had been quite weak at home so admitted from home. Discussions regarding goals were initiated by dr. Beltrán (oncologist) and together patient and dr. Beltrán determined that comort measures/palliative care was  appropriate. Palliative and hospice were consulted.      Assessment & Plan:  -continue comfort care for metastatic small cell lung cancer  -palliative and hospice following and pursuing comfort care; planning discharge home soon (possibly tomorrow) once hospice can deliver equipment, etc (I discussed w/ melisa from hospice on 12/21)        DVT Prophylaxis:  teds (stopped sq heparin as pursuing comfort measures and patient does not like the shots)    CODE STATUS: Comfort Measures and Allow Natural Death (A-N-D)    Disposition: I expect the patient to be discharged to be determined, likely home tomorrow  Pasquale Lopez MD  12/21/17  4:19 PM

## 2017-12-21 NOTE — SIGNIFICANT NOTE
Palliative Team Meeting Attendance  13:30  TOM Garcias RN, CHPN  COOIKE Corral, RN, CHDO KARLA Bardales, JOSE DANIELW, [facility]-   HUGH Paredes MDiv

## 2017-12-21 NOTE — PLAN OF CARE
Problem: Pain, Acute (Adult)  Goal: Identify Related Risk Factors and Signs and Symptoms  Outcome: Outcome(s) achieved Date Met: 12/20/17    Goal: Acceptable Pain Control/Comfort Level  Outcome: Ongoing (interventions implemented as appropriate)      Problem: Fall Risk (Adult)  Goal: Identify Related Risk Factors and Signs and Symptoms  Outcome: Outcome(s) achieved Date Met: 12/20/17    Goal: Absence of Falls  Outcome: Ongoing (interventions implemented as appropriate)      Problem: Pressure Ulcer Risk (Timbo Q Scale) (Pediatric)  Goal: Identify Related Risk Factors and Signs and Symptoms  Outcome: Outcome(s) achieved Date Met: 12/20/17    Goal: Skin Integrity  Outcome: Ongoing (interventions implemented as appropriate)

## 2017-12-21 NOTE — PROGRESS NOTES
Continued Stay Note   [place]     Patient Name: Lorri Bedoya  MRN: [id number]  Today's Date: 12/21/2017    Admit Date: 12/19/2017          Discharge Plan       12/21/17 0924    Case Management/Social Work Plan    Plan update    Patient/Family In Agreement With Plan yes    Additional Comments Per CM consult called patients daughter Dee who patient lives with and discussed care at home.  Dee indicates that she is a Physical Therapist and they do intend to bring her home.  Patient watson also indicates that she is expecting a call today from Hospice to discuss needs and discharge plans.  CM following.  Patient plan is to discharge home with hospice via car with family to transport when medically ready.               Discharge Codes     None        Expected Discharge Date and Time     Expected Discharge Date Expected Discharge Time    Dec 22, 2017             Mckenzie Reyes RN

## 2017-12-21 NOTE — PLAN OF CARE
Problem: Patient Care Overview (Adult)  Goal: Plan of Care Review  Outcome: Ongoing (interventions implemented as appropriate)   12/21/17 0905   Coping/Psychosocial Response Interventions   Plan Of Care Reviewed With patient   Patient Care Overview   Progress no change   Outcome Evaluation   Outcome Summary/Follow up Plan WOC consulted for coccyx friction area. Coccyx is reddened and blanching at this this time. Mild excoriation noted. Waffle mattress in place. Applied barrier cream and sacral foam dressing. Patient is comfort measures and Hospice has been consulted. Goal is comfort for patient. WOC will sign off at this time. Thanks

## 2017-12-21 NOTE — PROGRESS NOTES
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        Past Medical History, Past Surgical History, Social History, Family History have been reviewed and are without significant changes except as mentioned.      Medications:  The current medication list was reviewed in the EMR    ALLERGIES: No Known Allergies    ROS:  A comprehensive 14 point review of systems was performed and was negative except as mentioned.      Objective      Vitals:    12/21/17 0903 12/21/17 1009 12/21/17 1017 12/21/17 1200   BP:    144/80   BP Location:       Patient Position:       Pulse: 97 95 93 89   Resp:    18   Temp:    98 °F (36.7 °C)   TempSrc:    Oral   SpO2: 91% (!) 86% 91%    Weight:       Height:            ECOG: (4) Completely disabled, unable to carry out self-care and confined to bed or chair    General: Frail appearing, in no acute distress  HEENT: sclera anicteric, oropharynx clear  Lymphatics: no cervical, supraclavicular, inguinal, or axillary adenopathy  Cardiovascular: regular rate and rhythm, no murmurs  Lungs: clear to auscultation bilaterally  Abdomen: soft, nontender, nondistended.  No palpable organomegaly  ExtremIties: no lower extremity edema  Skin: no rashes, lesions, bruising, or petechiae  Neuro: Alert and oriented x 3. Moves all extremities.    RECENT LABS:        Results from last 7 days  Lab Units 12/21/17  0458 12/20/17  0753 12/19/17  1355   WBC 10*3/mm3 5.50 5.83 6.53   HEMOGLOBIN g/dL 12.9 12.7 13.9   PLATELETS 10*3/mm3 57* 59* 60*       Results from last 7 days  Lab Units 12/21/17  0458 12/20/17  0753 12/19/17  2332 12/19/17  1355  12/15/17  1019   SODIUM mmol/L 134 139  --  145  < > 142   POTASSIUM mmol/L 3.7 4.7 4.5 3.4*  < > 2.8*   CO2 mmol/L 30.0 29.0  --  34.0*  < > 32.0*   BUN mg/dL 8* 9  --  11  < > 19   CREATININE  mg/dL 0.40* 0.50*  --  0.50*  < > 0.70   MAGNESIUM mg/dL  --  2.4  --  1.8  --  1.9   PHOSPHORUS mg/dL  --  3.4  --  2.8  --  2.3*   GLUCOSE mg/dL 116* 154*  --  136*  < > 125*   AST (SGOT) U/L  --   --   --  37*  --  38*   ALT (SGPT) U/L  --   --   --  15  --  14   BILIRUBIN mg/dL  --   --   --  0.9  --  0.9   ALK PHOS U/L  --   --   --  144*  --  174*   < > = values in this interval not displayed.  Estimated Creatinine Clearance: 45.7 mL/min (by C-G formula based on Cr of 0.4).      Imaging Results (last 72 hours)     Procedure Component Value Units Date/Time    XR Abdomen KUB [[id number]] Collected:  12/19/17 1634     Updated:  12/19/17 1655    Narrative:       EXAMINATION: XR ABDOMEN KUB- 12/19/2017     INDICATION: abdominal pain      COMPARISON: NONE     FINDINGS: There is a nonspecific bowel gas pattern there is no evidence  of mechanical bowel obstruction. There is no hepatosplenomegaly.           Impression:       Negative radiograph of the abdomen.     D:  12/19/2017  E:  12/19/2017     This report was finalized on 12/19/2017 4:53 PM by Dr. Bladimir Kimball MD.             ASSESSMENT & PLAN:    1. Metastatic small cell lung cancer: Too frail for systemic therapies.  We'll go home with hospice and can do so any time from my standpoint wants palliative care and the hospitalists are ready.  She will follow-up as an outpatient and I will see her then.  2. Elevated BNP: Whether she has congestive failure or not is relatively moot in her overall debilitated state I would not work this up further at this junction.                  Dillan Beltrán MD    12/21/2017

## 2017-12-21 NOTE — PLAN OF CARE
Problem: Patient Care Overview (Adult)  Goal: Plan of Care Review  Outcome: Ongoing (interventions implemented as appropriate)   12/21/17 1105   Coping/Psychosocial Response Interventions   Plan Of Care Reviewed With patient   Patient Care Overview   Progress no change   Outcome Evaluation   Outcome Summary/Follow up Plan Transition to [facility] for hopefully discharge home with hospice.

## 2017-12-22 VITALS
BODY MASS INDEX: 18.66 KG/M2 | SYSTOLIC BLOOD PRESSURE: 132 MMHG | TEMPERATURE: 97.7 F | DIASTOLIC BLOOD PRESSURE: 70 MMHG | RESPIRATION RATE: 18 BRPM | HEIGHT: 65 IN | OXYGEN SATURATION: 89 % | WEIGHT: 111.99 LBS | HEART RATE: 99 BPM

## 2017-12-22 PROBLEM — E87.6 HYPOKALEMIA: Status: RESOLVED | Noted: 2017-12-19 | Resolved: 2017-12-22

## 2017-12-22 PROCEDURE — 94799 UNLISTED PULMONARY SVC/PX: CPT

## 2017-12-22 PROCEDURE — 25010000002 KETOROLAC TROMETHAMINE PER 15 MG: Performed by: FAMILY MEDICINE

## 2017-12-22 PROCEDURE — 99239 HOSP IP/OBS DSCHRG MGMT >30: CPT | Performed by: PHYSICIAN ASSISTANT

## 2017-12-22 PROCEDURE — 94760 N-INVAS EAR/PLS OXIMETRY 1: CPT

## 2017-12-22 PROCEDURE — 25010000002 HYDROMORPHONE PER 4 MG: Performed by: FAMILY MEDICINE

## 2017-12-22 RX ORDER — POLYETHYLENE GLYCOL 3350 17 G/17G
17 POWDER, FOR SOLUTION ORAL DAILY PRN
Qty: 238 G | Refills: 0 | Status: SHIPPED | OUTPATIENT
Start: 2017-12-22

## 2017-12-22 RX ORDER — SENNA AND DOCUSATE SODIUM 50; 8.6 MG/1; MG/1
2 TABLET, FILM COATED ORAL 2 TIMES DAILY
Qty: 60 TABLET | Refills: 0 | Status: SHIPPED | OUTPATIENT
Start: 2017-12-22

## 2017-12-22 RX ORDER — OXYCODONE HYDROCHLORIDE 30 MG/1
30 TABLET ORAL EVERY 4 HOURS PRN
Start: 2017-12-22

## 2017-12-22 RX ORDER — ALPRAZOLAM 0.5 MG/1
0.5 TABLET ORAL EVERY 8 HOURS
Qty: 15 TABLET | Refills: 0
Start: 2017-12-22 | End: 2017-12-30

## 2017-12-22 RX ORDER — IPRATROPIUM BROMIDE AND ALBUTEROL SULFATE 2.5; .5 MG/3ML; MG/3ML
3 SOLUTION RESPIRATORY (INHALATION) 4 TIMES DAILY
Qty: 360 ML | Refills: 1 | Status: SHIPPED | OUTPATIENT
Start: 2017-12-22

## 2017-12-22 RX ADMIN — OXYCODONE HYDROCHLORIDE 30 MG: 15 TABLET ORAL at 08:27

## 2017-12-22 RX ADMIN — ALPRAZOLAM 0.5 MG: 0.5 TABLET ORAL at 11:03

## 2017-12-22 RX ADMIN — KETOROLAC TROMETHAMINE 15 MG: 15 INJECTION, SOLUTION INTRAMUSCULAR; INTRAVENOUS at 13:17

## 2017-12-22 RX ADMIN — ALPRAZOLAM 0.5 MG: 0.5 TABLET ORAL at 04:01

## 2017-12-22 RX ADMIN — HYDROMORPHONE HYDROCHLORIDE 0.5 MG: 1 INJECTION, SOLUTION INTRAMUSCULAR; INTRAVENOUS; SUBCUTANEOUS at 04:01

## 2017-12-22 RX ADMIN — IPRATROPIUM BROMIDE AND ALBUTEROL SULFATE 3 ML: .5; 3 SOLUTION RESPIRATORY (INHALATION) at 07:22

## 2017-12-22 RX ADMIN — IPRATROPIUM BROMIDE AND ALBUTEROL SULFATE 3 ML: .5; 3 SOLUTION RESPIRATORY (INHALATION) at 12:50

## 2017-12-22 RX ADMIN — OXYCODONE HYDROCHLORIDE AND ACETAMINOPHEN 1 TABLET: 7.5; 325 TABLET ORAL at 13:17

## 2017-12-22 RX ADMIN — Medication 2 TABLET: at 08:27

## 2017-12-22 RX ADMIN — HYDROMORPHONE HYDROCHLORIDE 0.5 MG: 1 INJECTION, SOLUTION INTRAMUSCULAR; INTRAVENOUS; SUBCUTANEOUS at 15:17

## 2017-12-22 RX ADMIN — HYDROMORPHONE HYDROCHLORIDE 0.5 MG: 1 INJECTION, SOLUTION INTRAMUSCULAR; INTRAVENOUS; SUBCUTANEOUS at 11:03

## 2017-12-22 RX ADMIN — NICOTINE 1 PATCH: 21 PATCH, EXTENDED RELEASE TRANSDERMAL at 08:27

## 2017-12-22 RX ADMIN — KETOROLAC TROMETHAMINE 15 MG: 15 INJECTION, SOLUTION INTRAMUSCULAR; INTRAVENOUS at 06:43

## 2017-12-22 RX ADMIN — HYDROMORPHONE HYDROCHLORIDE 0.5 MG: 1 INJECTION, SOLUTION INTRAMUSCULAR; INTRAVENOUS; SUBCUTANEOUS at 07:19

## 2017-12-22 RX ADMIN — OXYCODONE HYDROCHLORIDE 30 MG: 15 TABLET ORAL at 12:45

## 2017-12-22 NOTE — DISCHARGE SUMMARY
[facility]  DISCHARGE SUMMARY    Patient Name: Lorri Bedoya  : 1940  MRN: [id number]    Date of Admission: 2017  Date of Discharge:  17  Primary Care Physician: Isidoro Zaidi MD    Consults     Date and Time Order Name Status Description    2017 1940 Inpatient Consult to Palliative Care MD Completed     2017 1411 Inpatient Consult to Hematology & Oncology Completed     2017 1411 Inpatient Consult to Palliative Care MD Completed         Hospital Course     Presenting Problem:   Small cell lung cancer [C34.90]  Metastatic bone cancer [C41.9]  Lung cancer [C34.90]  Metastatic cancer [C79.9]    Active Hospital Problems (** Indicates Principal Problem)    Diagnosis Date Noted   • **Metastatic cancer to spine [C79.51] 2017   • Lower extremity edema [R60.0] 2017   • Thrombocytopenia [D69.6] 2017   • Metastatic cancer [C79.9] 2017   • Malignant bone pain [G89.3, M89.8X9] 2017   • Hypoxia [R09.02] 2017   • Tobacco abuse [Z72.0] 2017   • Encephalopathy [G93.40] 2017   • Intractable low back pain [M54.5] 2017   • Generalized weakness [R53.1] 2017   • Hypophosphatemia [E83.39] 2017   • Small cell carcinoma of left lung [C34.92] 2017      Resolved Hospital Problems    Diagnosis Date Noted Date Resolved   • Hypokalemia [E87.6] 2017          Hospital Course:  Lorri Bedoya is a 77 y.o. female with PMH of recently diagnosed small cell lung cancer underwent recent left thoracotomy with left lung wedge resection 2017 with rapid recurrence found to have metastases to the spine underwent kyphoplasty to the L3 17 and was then  found to have complete lesion of L1 not amendable to surgery due to posterior disc bulge threatening spinal cord.  Patient underwent palliative radiation to the spine was then referred to Dr. Beltrán. Ongoing tobacco  abuse.. PET Imaging 12/13/17 revealed too numerous to count bone metastases, left long and pleural abnormalities along with liver lesion.  Patient was planned to have a port placement outpatient however due to severe hypokalemia and thrombocytopenic was unable to undergo surgery.  In the interim patient has become weaker, was seen in outpatient by Dr. Beltrán and may direct admit for further workup and management.  Pelvic care was consult consulted, Dr. Beltrán has evaluated inpatient, recommending comfort care, as well as hospice services upon discharge.  Plans to see as outpatient with upon discharge in approximately one week.  Elevated BNP upon admission, echocardiogram was obtained as family wishes to know for Prognostic implications.  Normal systolic LV function, EF 61-65 percent, grade 1 diastolic dysfunction.  Dr. Lin with palliative care has met with patient, pain is currently controlled with current regimen, prescription sent been provided.  Plan is to go home with hospice which has been arranged by hospice liaison.  DME equipment provided by [facility] including portable oxygen.  Patient is medically stable, poor prognosis, being discharged home with family today.           Day of Discharge     HPI:   Patient resting in bed at time of visit, no family present.  Patient says she is still having significant pain however pain medication is helping.  Is very eager to go home.  Says she gets a little short of breath when she is talking.  No nausea vomiting or constipation.  Discussed with patient and Dr. Beltrán plan to see as outpatient, she has requested to make this appointment on her own if she decides to go currently, says tolerating travel poorly.  is helpful hospice can optimize comfort.  No further questions at this time.    Review of Systems  Gen- No fevers, chills  CV- No chest pain, palpitations  Resp-  cough, dyspnea  GI- No N/V/D, abd pain  MSK- widespread musculoskeletal pain    Otherwise ROS  is negative except as mentioned in the HPI.    Vital Signs:   Temp:  [97.3 °F (36.3 °C)-98.1 °F (36.7 °C)] 97.7 °F (36.5 °C)  Heart Rate:  [] 102  Resp:  [16-20] 20  BP: (128-144)/(66-80) 132/70     Physical Exam:  Constitutional: Cachectic-appearing elderly female, appears in no acute distress , awake, alert  Eyes: PERRLA, sclerae anicteric, no conjunctival injection  HENT: NCAT, mucous membranes moist  Neck: Supple, no thyromegaly, no lymphadenopathy, trachea midline  Respiratory: Mildly dyspneic with conversation, rhonchi throughout cleared with cough, diminished throughout, satting 91% 5L  Cardiovascular: RRR, no murmurs, rubs, or gallops, palpable pedal pulses bilaterally  Gastrointestinal: Positive bowel sounds, soft, nontender, nondistended  Musculoskeletal: No bilateral ankle edema, no clubbing or cyanosis to extremities  Psychiatric: Appropriate affect, cooperative  Neurologic: Oriented x 3, speech is clear, follows commands  Skin: warm, dry.       Pertinent  and/or Most Recent Results       Results from last 7 days  Lab Units 12/21/17  0458 12/20/17  0753 12/19/17  2332 12/19/17  1355 12/18/17  0804 12/17/17  1348   WBC 10*3/mm3 5.50 5.83  --  6.53  --  7.80   HEMOGLOBIN g/dL 12.9 12.7  --  13.9  --  15.5   HEMATOCRIT % 42.3 40.6  --  46.0*  --  48.8*   PLATELETS 10*3/mm3 57* 59*  --  60*  --  67*   SODIUM mmol/L 134 139  --  145  --  145   POTASSIUM mmol/L 3.7 4.7 4.5 3.4* 2.7* 2.8*   CHLORIDE mmol/L 96* 105  --  106  --  98*   CO2 mmol/L 30.0 29.0  --  34.0*  --  34.0*   BUN mg/dL 8* 9  --  11  --  14   CREATININE mg/dL 0.40* 0.50*  --  0.50*  --  0.60   GLUCOSE mg/dL 116* 154*  --  136*  --  149*   CALCIUM mg/dL 9.4 9.2  --  9.7  --  10.4       Results from last 7 days  Lab Units 12/19/17  1355 12/17/17  1348   BILIRUBIN mg/dL 0.9  --    ALK PHOS U/L 144*  --    ALT (SGPT) U/L 15  --    AST (SGOT) U/L 37*  --    PROTIME Seconds  --  12.0*   INR   --  1.10   APTT seconds  --  25.2           Invalid  input(s): TG, LDLREALC    Results from last 7 days  Lab Units 12/19/17  1355 12/17/17  1348   HEMOGLOBIN A1C %  --  5.70*   BNP pg/mL 779.0*  --      Brief Urine Lab Results     None          Microbiology Results Abnormal     None          Imaging Results (all)     Procedure Component Value Units Date/Time    XR Abdomen KUB [[id number]] Collected:  12/19/17 1634     Updated:  12/19/17 1655    Narrative:       EXAMINATION: XR ABDOMEN KUB- 12/19/2017     INDICATION: abdominal pain      COMPARISON: NONE     FINDINGS: There is a nonspecific bowel gas pattern there is no evidence  of mechanical bowel obstruction. There is no hepatosplenomegaly.           Impression:       Negative radiograph of the abdomen.     D:  12/19/2017  E:  12/19/2017     This report was finalized on 12/19/2017 4:53 PM by Dr. Bladimir Kimball MD.             Results for orders placed during the hospital encounter of 12/19/17   Adult Transthoracic Echo Complete W/ Cont if Necessary Per Protocol    Narrative · Left ventricular systolic function is normal.  · Estimated EF appears to be in the range of 61 - 65%.  · Left ventricular diastolic dysfunction (grade I) consistent with   impaired relaxation.  · Mild mitral valve regurgitation is present  · Right ventricular cavity is mildly dilated.  · Mild to moderate tricuspid valve regurgitation is present.  · Calculated right ventricular systolic pressure from tricuspid   regurgitation is 56 mmHg.  · There is a right pleural effusion.  · There is a trivial pericardial effusion            Discharge Details      Lorri Bedoya   Home Medication Instructions JEREMIAH:[id number]    Printed on:12/22/17 5449   Medication Information                      ALPRAZolam (XANAX) 0.5 MG tablet  Take 1 tablet by mouth Every 8 (Eight) Hours for 23 doses.             butalbital-acetaminophen-caffeine (FIORICET, ESGIC) -40 MG per tablet  Take 1 tablet by mouth As Needed for Headache or Migraine.              DIPHENHYDRAMINE HCL, SLEEP, PO  Take 50 mg by mouth Every Night. 50 Mg as needed              ibuprofen (ADVIL,MOTRIN) 800 MG tablet  Take 800 mg by mouth Every 6 (Six) Hours As Needed for Moderate Pain .             ipratropium-albuterol (DUO-NEB) 0.5-2.5 mg/mL nebulizer  Take 3 mL by nebulization 4 (Four) Times a Day.             lidocaine-prilocaine (EMLA) 2.5-2.5 % cream  Apply  topically As Needed.             oxyCODONE (ROXICODONE) 30 MG immediate release tablet  Take 1 tablet by mouth Every 4 (Four) Hours As Needed for Severe Pain  for up to 20 doses.             polyethylene glycol (MIRALAX) pack packet  Take 17 g by mouth Daily As Needed (constipation).             rizatriptan (MAXALT) 10 MG tablet  Take 10 mg by mouth 1 (One) Time As Needed for Migraine. May repeat in 2 hours if needed             sennosides-docusate sodium (SENOKOT-S) 8.6-50 MG tablet  Take 2 tablets by mouth 2 (Two) Times a Day.                   Discharge Disposition:  Home or Self Care    Discharge Diet:  Diet Instructions     Advance Diet As Tolerated                     Discharge Activity:   Activity Instructions     Activity as Tolerated                   Future Appointments  Date Time Provider Department Center   1/8/2018 8:30 AM [facility]   1/8/2018 9:30 AM CHAIR 10  [facility]       Additional Instructions for the Follow-ups that You Need to Schedule     Discharge Follow-up with PCP    As directed    Follow Up Details:  if patient desires           Discharge Follow-up with Specialty: Oncology, Dr Beltrán; 1 Week    As directed    Specialty:  Oncology, Dr Beltrán    Follow Up:  1 Week    Follow Up Details:  Patient states will make appointment if desires                     Time Spent on Discharge:  40    Casie M Mayne, PA-C  12/22/17  11:59 AM  Attending Sunny MARTINO supervised care of the patient on day of discharge with direct care provided by the advanced care provider (APC).    Pasquale VILLA  MD John  12/22/17  6:15 PM

## 2017-12-22 NOTE — PLAN OF CARE
Problem: Pressure Ulcer Risk (Timbo Q Scale) (Pediatric)  Goal: Skin Integrity  Outcome: Ongoing (interventions implemented as appropriate)   12/22/17 5487   Pressure Ulcer Risk (Timbo Q Scale) (Pediatric)   Skin Integrity making progress toward outcome

## 2017-12-22 NOTE — PLAN OF CARE
Problem: Patient Care Overview (Adult)  Goal: Plan of Care Review  Outcome: Ongoing (interventions implemented as appropriate)   12/22/17 0423   Coping/Psychosocial Response Interventions   Plan Of Care Reviewed With patient   Patient Care Overview   Progress progress towards functional goals is fair   Outcome Evaluation   Outcome Summary/Follow up Plan patient was able to sleep most of sshiftbut woke for breakthrough pain meds.       Problem: Pain, Acute (Adult)  Goal: Acceptable Pain Control/Comfort Level  Outcome: Ongoing (interventions implemented as appropriate)   12/22/17 0423   Pain, Acute (Adult)   Acceptable Pain Control/Comfort Level making progress toward outcome       Problem: Fall Risk (Adult)  Goal: Absence of Falls  Outcome: Ongoing (interventions implemented as appropriate)   12/22/17 0423   Fall Risk (Adult)   Absence of Falls making progress toward outcome

## (undated) DEVICE — PK MINOR SPLT 10

## (undated) DEVICE — CVR HNDL LT SURG ACCSSRY BLU STRL

## (undated) DEVICE — DRSNG SURESITE WNDW 2.38X2.75

## (undated) DEVICE — NDL HYPO ECLPS SFTY 18G 1 1/2IN

## (undated) DEVICE — DECANT BG O JET

## (undated) DEVICE — SKIN AFFIX SURG ADHESIVE 72/CS 0.55ML: Brand: MEDLINE

## (undated) DEVICE — APPL CHLORAPREP W/TINT 26ML BLU

## (undated) DEVICE — SNAP KOVER: Brand: UNBRANDED

## (undated) DEVICE — ANTIBACTERIAL UNDYED BRAIDED (POLYGLACTIN 910), SYNTHETIC ABSORBABLE SUTURE: Brand: COATED VICRYL

## (undated) DEVICE — SUT SILK 2/0 PS 18IN 1588H

## (undated) DEVICE — GOWN,NON-REINFORCED,SIRUS,SET IN SLV,XL: Brand: MEDLINE